# Patient Record
Sex: FEMALE | Race: WHITE | NOT HISPANIC OR LATINO | Employment: UNEMPLOYED | URBAN - NONMETROPOLITAN AREA
[De-identification: names, ages, dates, MRNs, and addresses within clinical notes are randomized per-mention and may not be internally consistent; named-entity substitution may affect disease eponyms.]

---

## 2018-01-11 NOTE — MISCELLANEOUS
Provider Comments  Provider Comments:   Dear Wesley Soares had a scheduled appointment at our office today but were unable to keep  We attempted to call you back but were unable to reach you  It is very important that you follow up with us so that we can assess your physical and nutritional safety after your surgery  Please call our office at [phone] to reschedule your appointment       Sincerely,     [facility]        Signatures   Electronically signed by : Sherryle Canny, [facility]; Jun 7 2016  4:10PM EST                       (Author)

## 2022-05-03 ENCOUNTER — OFFICE VISIT (OUTPATIENT)
Dept: PHYSICAL THERAPY | Facility: CLINIC | Age: 57
End: 2022-05-03
Payer: COMMERCIAL

## 2022-05-03 DIAGNOSIS — R53.1 RIGHT SIDED WEAKNESS: Primary | ICD-10-CM

## 2022-05-03 DIAGNOSIS — R26.9 ABNORMAL GAIT: ICD-10-CM

## 2022-05-03 DIAGNOSIS — Z86.73 HISTORY OF STROKE: ICD-10-CM

## 2022-05-03 PROCEDURE — 97162 PT EVAL MOD COMPLEX 30 MIN: CPT | Performed by: PHYSICAL THERAPIST

## 2022-05-03 NOTE — LETTER
May 3, 2022    Yesenia Webster MD  [address]  [address]  [facility] [address]    Patient: Alda Taylor   YOB: 1965   Date of Visit: 5/3/2022     Encounter Diagnosis     ICD-10-CM    1  Right sided weakness  R53 1    2  History of stroke  Z86 73    3  Abnormal gait  R26 9        Dear Dr Yahaira Ramon: Thank you for your recent referral of Alda Taylor  Please review the attached evaluation summary from Christian's recent visit  Please verify that you agree with the plan of care by signing the attached order  If you have any questions or concerns, please do not hesitate to call  I sincerely appreciate the opportunity to share in the care of one of your patients and hope to have another opportunity to work with you in the near future  Sincerely,    Ramona Doran, PT      Referring Provider:      I certify that I have read the below Plan of Care and certify the need for these services furnished under this plan of treatment while under my care  Yesenia Webster MD  [address]  [address] [place] [address]  Via Fax: [phone]          PT Evaluation     Today's date: 5/3/2022  Patient name: Alda Taylor  : 1965  MRN: [id number]  Referring provider: Christiano Ch PT  Dx:   Encounter Diagnosis     ICD-10-CM    1  Right sided weakness  R53 1    2  History of stroke  Z86 73    3  Abnormal gait  R26 9                   Assessment  Assessment details: Patient is a 62year old female who presents to PT with c/o right sided weakness causing decreased use of right UE and LE  PT examination shows limitations including right UE and LE weakness, decreased motor control, abnormal gait/balance and limited endurance limiting functional mobility   Patient would benefit from PT intervention including exercises for rom, strength and stability, functional training, manual therapy, HEP, postural training, aerobic conditioning, balance/gait training and pain relieving modalities in order to maximize functional independence  Impairments: abnormal gait, abnormal muscle tone, abnormal or restricted ROM, activity intolerance, impaired balance, impaired physical strength, lacks appropriate home exercise program, safety issue, weight-bearing intolerance and poor posture     Goals  ST  Initiate HEP  2  Patient to increase right UE and LE strength to 4-/5 t/o in 4 weeks    LT  Patient to increase right UE and LE strength to 4+/5 in 8 weeks  2  Patient to improve ambulation and balance to [facility] in 8 weeks  3  Patient to increase endurance to [facility] in 8 weeks    Plan  Patient would benefit from: PT eval and skilled physical therapy  Planned modality interventions: cryotherapy and thermotherapy: hydrocollator packs  Other planned modality interventions: Modalities PRN  Planned therapy interventions: IADL retraining, ADL retraining, manual therapy, balance, balance/weight bearing training, neuromuscular re-education, patient education, postural training, strengthening, stretching, therapeutic activities, therapeutic exercise, therapeutic training, transfer training, gait training, functional ROM exercises, flexibility, graded activity, graded exercise and home exercise program  Frequency: 2x week  Duration in visits: 8  Duration in weeks: 4  Treatment plan discussed with: patient        Subjective Evaluation    History of Present Illness  Date of onset: 2021  Mechanism of injury: Patient presents to PT with c/o right sided weakness stemming from CVA suffered on  of last year  Patient was d/c home from the hospital after 2 days and had rehab in SNF for a month afterwards  Patient's main complaint is with right UE  She has limited motion, strength and motor use of right UE and hand  She has difficulty lifting arm up to turn off her lights and is not able to write or grasp with right hand  Patient also notes weakness and foot drop in right ankle/foot   She reports she will get some locking in right shoulder which will cause pain  Patient now presents to PT via direct access  Pain  Aggravating factors: overhead activity, lifting and walking  Progression: no change    Social Support  Stairs in house: yes   15    Patient Goals  Patient goal: To work on my arm and get some strength back in my legs        Objective     Static Posture     Shoulders  Depressed  Neurological Testing     Sensation     Shoulder   Left Shoulder   Intact: light touch    Right Shoulder   Intact: light touch    Wrist/Hand   Left   Intact: light touch    Right   Diminished: light touch  Paresthesia: light touch    Active Range of Motion   Left Shoulder   Normal active range of motion    Right Shoulder   Flexion: 45 degrees   Abduction: 75 degrees     Left Elbow   Normal active range of motion    Right Elbow   Flexion: WFL  Extension: WFL    Additional Active Range of Motion Details  Right wrist PROM limited 50%  Patient with limited finger extension, she states her hand and fingers will continuously flex up on her    Passive Range of Motion   Left Shoulder   Normal passive range of motion    Right Shoulder   Flexion: 90 degrees   Abduction: 100 degrees     Right Wrist   Normal passive range of motion    Additional Passive Range of Motion Details  Full PROM in right hand    Strength/Myotome Testing     Right Shoulder     Planes of Motion   Flexion: 2+   Abduction: 3-   External rotation at 0°: 2+   Internal rotation at 0°: 3-     Right Elbow   Flexion: 3  Extension: 3  Forearm supination: 3  Forearm pronation: 3    Left Wrist/Hand   Normal wrist strength    Right Wrist/Hand   Wrist extension: 2+  Wrist flexion: 2+  Radial deviation: 2+  Ulnar deviation: 2+    Ambulation     Ambulation: Level Surfaces   Ambulation without assistive device: independent    Observational Gait   Gait: asymmetric   Decreased walking speed, stride length, right stance time and right step length     Right foot contact pattern: foot flat    Additional Observational Gait Details  Patient using AFO brace for foot drop on right                Precautions Previous CVA with right sided weakness       Manuals 5/3/22       Right Shoulder and wrist PROM and stretch                                Neuro Re-Ed         MTP/LTP        Side-Stepping        Stance on foam        Stance with EC                                                                Ther Ex        Nustep        UBE        TR/HR        Standing SLR 3-way        LAQ        Seated Marches        Seated HS curls with TB        Sup/Pron Roller        Bicep Curls        Wrist AROM                        Ther Activity                        Gait Training                        Modalities

## 2022-05-03 NOTE — PROGRESS NOTES
PT Evaluation     Today's date: 5/3/2022  Patient name: Mary Alejandro  : 1965  MRN: [id number]  Referring provider: Lonny Magallon PT  Dx:   Encounter Diagnosis     ICD-10-CM    1  Right sided weakness  R53 1    2  History of stroke  Z86 73    3  Abnormal gait  R26 9                   Assessment  Assessment details: Patient is a 62year old female who presents to PT with c/o right sided weakness causing decreased use of right UE and LE  PT examination shows limitations including right UE and LE weakness, decreased motor control, abnormal gait/balance and limited endurance limiting functional mobility  Patient would benefit from PT intervention including exercises for rom, strength and stability, functional training, manual therapy, HEP, postural training, aerobic conditioning, balance/gait training and pain relieving modalities in order to maximize functional independence  Impairments: abnormal gait, abnormal muscle tone, abnormal or restricted ROM, activity intolerance, impaired balance, impaired physical strength, lacks appropriate home exercise program, safety issue, weight-bearing intolerance and poor posture     Goals  ST  Initiate HEP  2  Patient to increase right UE and LE strength to 4-/5 t/o in 4 weeks    LT  Patient to increase right UE and LE strength to 4+/5 in 8 weeks  2  Patient to improve ambulation and balance to [facility] in 8 weeks  3   Patient to increase endurance to [facility] in 8 weeks    Plan  Patient would benefit from: PT eval and skilled physical therapy  Planned modality interventions: cryotherapy and thermotherapy: hydrocollator packs  Other planned modality interventions: Modalities PRN  Planned therapy interventions: IADL retraining, ADL retraining, manual therapy, balance, balance/weight bearing training, neuromuscular re-education, patient education, postural training, strengthening, stretching, therapeutic activities, therapeutic exercise, therapeutic training, transfer training, gait training, functional ROM exercises, flexibility, graded activity, graded exercise and home exercise program  Frequency: 2x week  Duration in visits: 8  Duration in weeks: 4  Treatment plan discussed with: patient        Subjective Evaluation    History of Present Illness  Date of onset: 6/27/2021  Mechanism of injury: Patient presents to PT with c/o right sided weakness stemming from CVA suffered on June 27th of last year  Patient was d/c home from the hospital after 2 days and had rehab in SNF for a month afterwards  Patient's main complaint is with right UE  She has limited motion, strength and motor use of right UE and hand  She has difficulty lifting arm up to turn off her lights and is not able to write or grasp with right hand  Patient also notes weakness and foot drop in right ankle/foot  She reports she will get some locking in right shoulder which will cause pain  Patient now presents to PT via direct access  Pain  Aggravating factors: overhead activity, lifting and walking  Progression: no change    Social Support  Stairs in house: yes   15    Patient Goals  Patient goal: To work on my arm and get some strength back in my legs        Objective     Static Posture     Shoulders  Depressed  Neurological Testing     Sensation     Shoulder   Left Shoulder   Intact: light touch    Right Shoulder   Intact: light touch    Wrist/Hand   Left   Intact: light touch    Right   Diminished: light touch  Paresthesia: light touch    Active Range of Motion   Left Shoulder   Normal active range of motion    Right Shoulder   Flexion: 45 degrees   Abduction: 75 degrees     Left Elbow   Normal active range of motion    Right Elbow   Flexion: WFL  Extension: WFL    Additional Active Range of Motion Details  Right wrist PROM limited 50%   Patient with limited finger extension, she states her hand and fingers will continuously flex up on her    Passive Range of Motion   Left Shoulder   Normal passive range of motion    Right Shoulder   Flexion: 90 degrees   Abduction: 100 degrees     Right Wrist   Normal passive range of motion    Additional Passive Range of Motion Details  Full PROM in right hand    Strength/Myotome Testing     Right Shoulder     Planes of Motion   Flexion: 2+   Abduction: 3-   External rotation at 0°: 2+   Internal rotation at 0°: 3-     Right Elbow   Flexion: 3  Extension: 3  Forearm supination: 3  Forearm pronation: 3    Left Wrist/Hand   Normal wrist strength    Right Wrist/Hand   Wrist extension: 2+  Wrist flexion: 2+  Radial deviation: 2+  Ulnar deviation: 2+    Ambulation     Ambulation: Level Surfaces   Ambulation without assistive device: independent    Observational Gait   Gait: asymmetric   Decreased walking speed, stride length, right stance time and right step length     Right foot contact pattern: foot flat    Additional Observational Gait Details  Patient using AFO brace for foot drop on right                Precautions Previous CVA with right sided weakness       Manuals 5/3/22       Right Shoulder and wrist PROM and stretch                                Neuro Re-Ed         MTP/LTP        Side-Stepping        Stance on foam        Stance with EC                                                                Ther Ex        Nustep        UBE        TR/HR        Standing SLR 3-way        LAQ        Seated Marches        Seated HS curls with TB        Sup/Pron Roller        Bicep Curls        Wrist AROM                        Ther Activity                        Gait Training                        Modalities

## 2022-05-06 ENCOUNTER — OFFICE VISIT (OUTPATIENT)
Dept: PHYSICAL THERAPY | Facility: CLINIC | Age: 57
End: 2022-05-06
Payer: COMMERCIAL

## 2022-05-06 DIAGNOSIS — R53.1 RIGHT SIDED WEAKNESS: Primary | ICD-10-CM

## 2022-05-06 PROCEDURE — 97110 THERAPEUTIC EXERCISES: CPT

## 2022-05-06 NOTE — PROGRESS NOTES
Daily Note     Today's date: 2022  Patient name: Gabriel Brown  : 1965  MRN: [id number]  Referring provider: Marichuy Frey PT  Dx:   Encounter Diagnosis     ICD-10-CM    1  Right sided weakness  R53 1                 Subjective:  "Im ok"        Objective: See treatment diary below      Assessment: Tolerated treatment well  Patient exhibited good technique with therapeutic exercises      Plan: Continue per plan of care         Manuals 5/3/22 2022      Right Shoulder and wrist PROM and stretch                                Neuro Re-Ed         MTP/LTP        Side-Stepping  3x30"      Stance on foam  3x30"      Stance with EC  3x30"                                                              Ther Ex        Nustep  l2   10      UBE        TR/HR  20      Standing SLR 3-way  2x10      LAQ  2x10      Seated Marches  2x10      Seated HS curls with TB  2x10      Sup/Pron Roller        Bicep Curls        Wrist AROM        Seated hip abd                Ther Activity                        Gait Training                        Modalities

## 2022-05-10 ENCOUNTER — OFFICE VISIT (OUTPATIENT)
Dept: PHYSICAL THERAPY | Facility: CLINIC | Age: 57
End: 2022-05-10
Payer: COMMERCIAL

## 2022-05-10 DIAGNOSIS — R53.1 RIGHT SIDED WEAKNESS: Primary | ICD-10-CM

## 2022-05-10 DIAGNOSIS — Z86.73 HISTORY OF STROKE: ICD-10-CM

## 2022-05-10 DIAGNOSIS — R26.9 ABNORMAL GAIT: ICD-10-CM

## 2022-05-10 PROCEDURE — 97110 THERAPEUTIC EXERCISES: CPT

## 2022-05-10 PROCEDURE — 97530 THERAPEUTIC ACTIVITIES: CPT

## 2022-05-10 NOTE — PROGRESS NOTES
Daily Note     Today's date: 5/10/2022  Patient name: Joao Stone  : 1965  MRN: [id number]  Referring provider: Ole Escobar, PT  Dx:   Encounter Diagnosis     ICD-10-CM    1  Right sided weakness  R53 1    2  History of stroke  Z86 73    3  Abnormal gait  R26 9                   Subjective:Patient reports that she is feeling good today  Objective: See treatment diary below      Assessment: Patient did well with all TE as listed, fatigued post tx  Pt will continue to benefit from skilled PT to maximize functional mobility  Plan: Continue per plan of care        Manuals 5/3/22 2022 5/10/22     Right Shoulder and wrist PROM and stretch                                Neuro Re-Ed         MTP/LTP        Side-Stepping  3x30" 3x30"     Stance on foam  3x30" 3x30"     Stance with EC  3x30" 3x30"                                                             Ther Ex        Nustep  l2   10 L2 10'     UBE        TR/HR  20 20     Standing SLR 3-way  2x10 2x10 ea     LAQ  2x10 2# 3" 2x10     Seated Marches  2x10 2x10     Seated HS curls with TB  2x10 RTB 2x10     Sup/Pron Roller        Bicep Curls   2# 2x10     Wrist AROM        Seated hip abd        pulleys   4 min     Ther Activity                        Gait Training                        Modalities

## 2022-05-11 ENCOUNTER — OFFICE VISIT (OUTPATIENT)
Dept: PHYSICAL THERAPY | Facility: CLINIC | Age: 57
End: 2022-05-11
Payer: COMMERCIAL

## 2022-05-11 DIAGNOSIS — Z86.73 HISTORY OF STROKE: ICD-10-CM

## 2022-05-11 DIAGNOSIS — R26.9 ABNORMAL GAIT: ICD-10-CM

## 2022-05-11 DIAGNOSIS — R53.1 RIGHT SIDED WEAKNESS: Primary | ICD-10-CM

## 2022-05-11 PROCEDURE — 97112 NEUROMUSCULAR REEDUCATION: CPT

## 2022-05-11 PROCEDURE — 97110 THERAPEUTIC EXERCISES: CPT

## 2022-05-11 NOTE — PROGRESS NOTES
Daily Note     Today's date: 2022  Patient name: Ryan Dasilva  : 1965  MRN: [id number]  Referring provider: Anne Miranda, PT  Dx:   Encounter Diagnosis     ICD-10-CM    1  Right sided weakness  R53 1    2  History of stroke  Z86 73    3  Abnormal gait  R26 9                   Subjective: Pt reports feeling good today  Did have some soreness following her LV; only lasted a day or two  Objective: See treatment diary below      Assessment: Tolerated treatment well  Continued with program as outlined below  Progressed with added resistance for all standing interventions  Was challenged with this progression but able to complete all assigned reps/sets  Patient would benefit from continued PT to further improve strength      Plan: Continue per plan of care        Manuals 5/3/22 2022 5/10/22 5/11/22    Right Shoulder and wrist PROM and stretch                                Neuro Re-Ed         MTP/LTP        Side-Stepping  3x30" 3x30" Airex, 2 laps    Tandem walk on airex    1 lap    Stance on foam  3x30" 3x30" 3x30"    Stance with EC  3x30" 3x30" 3x30"    On foam 2x30"                                                            Ther Ex        Nustep  l2   10 L2 10' L2 10'    UBE        TR/HR  20 20 20    Standing SLR 3-way  2x10 2x10 ea R 2#/ L 3# 2x10    LAQ  2x10 2# 3" 2x10 R 2#/ L 3# 2x10    Seated Marches  2x10 2x10 R 2#/ L 3#  2x10    Seated HS curls with TB  2x10 RTB 2x10 RTB 2x10    Sup/Pron Roller        Bicep Curls   2# 2x10 2# 2x10    Wrist AROM        Seated hip abd        pulleys   4 min 5 min    Ther Activity                        Gait Training                        Modalities

## 2022-05-17 ENCOUNTER — OFFICE VISIT (OUTPATIENT)
Dept: PHYSICAL THERAPY | Facility: CLINIC | Age: 57
End: 2022-05-17
Payer: COMMERCIAL

## 2022-05-17 DIAGNOSIS — R26.9 ABNORMAL GAIT: ICD-10-CM

## 2022-05-17 DIAGNOSIS — Z86.73 HISTORY OF STROKE: ICD-10-CM

## 2022-05-17 DIAGNOSIS — R53.1 RIGHT SIDED WEAKNESS: Primary | ICD-10-CM

## 2022-05-17 PROCEDURE — 97112 NEUROMUSCULAR REEDUCATION: CPT

## 2022-05-17 PROCEDURE — 97110 THERAPEUTIC EXERCISES: CPT

## 2022-05-17 NOTE — PROGRESS NOTES
Daily Note     Today's date: 2022  Patient name: Seth Sarmiento  : 1965  MRN: [id number]  Referring provider: Karthik Krishna PT  Dx:   Encounter Diagnosis     ICD-10-CM    1  Right sided weakness  R53 1    2  History of stroke  Z86 73    3  Abnormal gait  R26 9        Start Time: 0805  Stop Time: 0900  Total time in clinic (min): 55 minutes    Subjective: Patient offered no complaints at this time  Objective: See treatment diary below      Assessment: Tolerated treatment well  Patient exhibited good technique with therapeutic exercises and would benefit from continued PT to improve strength and ROM  She completed program with minimal to no fatigue, and did well with added hip abduction  Plan: Continue per plan of care        Manuals 5/3/22 2022 5/10/22 5/11/22 5/17   Right Shoulder and wrist PROM and stretch                                Neuro Re-Ed         MTP/LTP        Side-Stepping  3x30" 3x30" Airex, 2 laps 3x30"   Tandem walk on airex    1 lap    Stance on foam  3x30" 3x30" 3x30" 3x30'   Stance with EC  3x30" 3x30" 3x30"    On foam 2x30" 3x30"                                                                Ther Ex        Nustep  l2   10 L2 10' L2 10' 10 min L2    UBE        TR/HR  20 20 20 20x    Standing SLR 3-way  2x10 2x10 ea R 2#/ L 3# 2x10 R 2# / L3# 2x10    LAQ  2x10 2# 3" 2x10 R 2#/ L 3# 2x10 R2#/L3# 2x10   Seated Marches  2x10 2x10 R 2#/ L 3#  2x10 R 2#/ L 3#    Seated HS curls with TB  2x10 RTB 2x10 RTB 2x10 GTB 2x10    Sup/Pron Roller        Bicep Curls   2# 2x10 2# 2x10 2# cuff wgt 2x10    Wrist AROM        Seated hip abd     GTB 2x10    pulleys   4 min 5 min 5 min    Ther Activity                        Gait Training                        Modalities

## 2022-05-20 ENCOUNTER — OFFICE VISIT (OUTPATIENT)
Dept: PHYSICAL THERAPY | Facility: CLINIC | Age: 57
End: 2022-05-20
Payer: COMMERCIAL

## 2022-05-20 DIAGNOSIS — R53.1 RIGHT SIDED WEAKNESS: Primary | ICD-10-CM

## 2022-05-20 PROCEDURE — 97110 THERAPEUTIC EXERCISES: CPT

## 2022-05-20 PROCEDURE — 97112 NEUROMUSCULAR REEDUCATION: CPT

## 2022-05-20 NOTE — PROGRESS NOTES
Daily Note     Today's date: 2022  Patient name: Ashley Day  : 1965  MRN: [id number]  Referring provider: Gabriel Pinto PT  Dx:   Encounter Diagnosis     ICD-10-CM    1  Right sided weakness  R53 1                   Subjective:   "im ok"        Objective: See treatment diary below      Assessment: Tolerated treatment well  Patient exhibited good technique with therapeutic exercises      Plan: Continue per plan of care        Manuals 2022 2022 5/10/22 5/11/22 5/17   Right Shoulder and wrist PROM and stretch                                Neuro Re-Ed         MTP/LTP        Side-Stepping 3x30 3x30" 3x30" Airex, 2 laps 3x30"   Tandem walk on airex    1 lap    Stance on foam 3x30 3x30" 3x30" 3x30" 3x30'   Stance with EC 3x30 3x30" 3x30" 3x30"    On foam 2x30" 3x30"                                                                Ther Ex        Nustep L2  10  l2   10 L2 10' L2 10' 10 min L2    UBE        TR/HR 20 20 20 20 20x    Standing SLR 3-way 2x10 2x10 2x10 ea R 2#/ L 3# 2x10 R 2# / L3# 2x10    LAQ 2x10 2x10 2# 3" 2x10 R 2#/ L 3# 2x10 R2#/L3# 2x10   Seated Marches 2x10 2x10 2x10 R 2#/ L 3#  2x10 R 2#/ L 3#    Seated HS curls with TB 2x10 2x10 RTB 2x10 RTB 2x10 GTB 2x10    Sup/Pron Roller        Bicep Curls 2#  2x10  2# 2x10 2# 2x10 2# cuff wgt 2x10    Wrist AROM        Seated hip abd     GTB 2x10    pulleys 5  4 min 5 min 5 min    Ther Activity                        Gait Training                        Modalities

## 2022-05-24 ENCOUNTER — APPOINTMENT (OUTPATIENT)
Dept: PHYSICAL THERAPY | Facility: CLINIC | Age: 57
End: 2022-05-24
Payer: COMMERCIAL

## 2022-05-26 ENCOUNTER — OFFICE VISIT (OUTPATIENT)
Dept: PHYSICAL THERAPY | Facility: CLINIC | Age: 57
End: 2022-05-26
Payer: COMMERCIAL

## 2022-05-26 DIAGNOSIS — R26.9 ABNORMAL GAIT: ICD-10-CM

## 2022-05-26 DIAGNOSIS — Z86.73 HISTORY OF STROKE: ICD-10-CM

## 2022-05-26 DIAGNOSIS — R53.1 RIGHT SIDED WEAKNESS: Primary | ICD-10-CM

## 2022-05-26 PROCEDURE — 97112 NEUROMUSCULAR REEDUCATION: CPT

## 2022-05-26 PROCEDURE — 97110 THERAPEUTIC EXERCISES: CPT

## 2022-05-26 NOTE — PROGRESS NOTES
Daily Note     Today's date: 2022  Patient name: Ulysses Abelson  : 1965  MRN: [id number]  Referring provider: Dom Rg PT  Dx:   Encounter Diagnosis     ICD-10-CM    1  Right sided weakness  R53 1    2  History of stroke  Z86 73    3  Abnormal gait  R26 9        Start Time: 1100  Stop Time: 1145  Total time in clinic (min): 45 minutes    Subjective: Patient stated doing well today, as she had her OT earlier today and had gone for a walk       Objective: See treatment diary below      Assessment: Patient did well with program today, some fatigue noted post treat  She continues to do well with exercises, including added balance and exercises  Plan: Continue per plan of care        Manuals 2022 5/26 5/10/22 5/11/22 5/17   Right Shoulder and wrist PROM and stretch                                Neuro Re-Ed         MTP/LTP        Side-Stepping 3x30 3x30  3x30" Airex, 2 laps 3x30"   Tandem walk on airex  On floor 3x8'   1 lap    Stance on foam 3x30 3x30 3x30" 3x30" 3x30'   Stance with EC 3x30 3x30  3x30" 3x30"    On foam 2x30" 3x30"        grapevine  3x8'                                                       Ther Ex        Nustep L2  10  L2   10 L2 10' L2 10' 10 min L2    lunges  3x8'       TR/HR 20 20x 20 20 20x    Standing SLR 3-way 2x10 R 2# / L3# 2x10  2x10 ea R 2#/ L 3# 2x10 R 2# / L3# 2x10    LAQ 2x10 R 2#/ L3# 2x10  2# 3" 2x10 R 2#/ L 3# 2x10 R2#/L3# 2x10   Seated Marches 2x10 R2# / L3#   2x10  2x10 R 2#/ L 3#  2x10 R 2#/ L 3#    Seated HS curls with TB 2x10 GTB 2x10 RTB 2x10 RTB 2x10 GTB 2x10    Sup/Pron Roller        Bicep Curls 2#  2x10   2# 2x10 2# 2x10 2# cuff wgt 2x10    Wrist AROM        Seated hip abd  GTB 2x10   GTB 2x10    squat  2x10       pulleys 5 5 min  4 min 5 min 5 min    Ther Activity                        Gait Training                        Modalities

## 2022-05-27 ENCOUNTER — OFFICE VISIT (OUTPATIENT)
Dept: PHYSICAL THERAPY | Facility: CLINIC | Age: 57
End: 2022-05-27
Payer: COMMERCIAL

## 2022-05-27 DIAGNOSIS — R53.1 RIGHT SIDED WEAKNESS: Primary | ICD-10-CM

## 2022-05-27 PROCEDURE — 97140 MANUAL THERAPY 1/> REGIONS: CPT

## 2022-05-27 PROCEDURE — 97110 THERAPEUTIC EXERCISES: CPT

## 2022-05-31 ENCOUNTER — OFFICE VISIT (OUTPATIENT)
Dept: PHYSICAL THERAPY | Facility: CLINIC | Age: 57
End: 2022-05-31
Payer: COMMERCIAL

## 2022-05-31 DIAGNOSIS — Z86.73 HISTORY OF STROKE: ICD-10-CM

## 2022-05-31 DIAGNOSIS — R53.1 RIGHT SIDED WEAKNESS: Primary | ICD-10-CM

## 2022-05-31 DIAGNOSIS — R26.9 ABNORMAL GAIT: ICD-10-CM

## 2022-05-31 PROCEDURE — 97110 THERAPEUTIC EXERCISES: CPT | Performed by: PHYSICAL THERAPIST

## 2022-05-31 PROCEDURE — 97112 NEUROMUSCULAR REEDUCATION: CPT | Performed by: PHYSICAL THERAPIST

## 2022-05-31 NOTE — PROGRESS NOTES
Daily Note     Today's date: 2022  Patient name: Jorge Yap  : 1965  MRN: [id number]  Referring provider: Aashish Carrillo PT  Dx:   Encounter Diagnosis     ICD-10-CM    1  Right sided weakness  R53 1    2  History of stroke  Z86 73    3  Abnormal gait  R26 9                   Subjective: "I'm ok today"  Objective: See treatment diary below      Assessment: Tolerated treatment well  Patient exhibited good technique with therapeutic exercises      Plan: Continue per plan of care        Manuals 2022   Right Shoulder and wrist PROM and stretch                                Neuro Re-Ed         MTP/LTP        Side-Stepping 3x30 3x30  3x30" Airex, 2 laps 3x30"   Tandem walk on airex  On floor 3x8'  3x8' 1 lap    Stance on foam 3x30 3x30 3x30" 3x30" 3x30'   Stance with EC 3x30 3x30  3x30" 3x30"    On foam 2x30" 3x30"        grapevine  3x8'  3x8'                                                     Ther Ex        Nustep L2  10  L2   10 L2 10' L2 10' 10 min L2    lunges  3x8'  3x8'     TR/HR 20 20x 20 20 20x    Standing SLR 3-way 2x10 R 2# / L3# 2x10  R 2#, L 3# 2x10 ea R 2#/ L 3# 2x10 R 2# / L3# 2x10    LAQ 2x10 R 2#/ L3# 2x10  R 2# L 3# 3" 2x10 R 2#/ L 3# 2x10 R2#/L3# 2x10   Seated Marches 2x10 R2# / L3#   2x10  R 2#, L 3# 2x10 R 2#/ L 3#  2x10 R 2#/ L 3#    Seated HS curls with TB 2x10 GTB 2x10 GTB 2x10 RTB 2x10 GTB 2x10    Sup/Pron Roller        Bicep Curls 2#  2x10  2#  2x10 2# 2x10 2# 2x10 2# cuff wgt 2x10    Wrist AROM        Seated hip abd  GTB 2x10 GTB 2x10  GTB 2x10    squat  2x10  2x10     pulleys 5 5 min  4 min 5 min 5 min    Ther Activity                        Gait Training                        Modalities

## 2022-06-02 ENCOUNTER — OFFICE VISIT (OUTPATIENT)
Dept: PHYSICAL THERAPY | Facility: CLINIC | Age: 57
End: 2022-06-02
Payer: COMMERCIAL

## 2022-06-02 DIAGNOSIS — Z86.73 HISTORY OF STROKE: ICD-10-CM

## 2022-06-02 DIAGNOSIS — R26.9 ABNORMAL GAIT: ICD-10-CM

## 2022-06-02 DIAGNOSIS — R53.1 RIGHT SIDED WEAKNESS: Primary | ICD-10-CM

## 2022-06-02 PROCEDURE — 97110 THERAPEUTIC EXERCISES: CPT

## 2022-06-02 PROCEDURE — 97140 MANUAL THERAPY 1/> REGIONS: CPT

## 2022-06-02 NOTE — PROGRESS NOTES
Daily Note     Today's date: 2022  Patient name: Danni Rivera  : 1965  MRN: [id number]  Referring provider: Deonte Jacobs, PT  Dx:   Encounter Diagnosis     ICD-10-CM    1  Right sided weakness  R53 1    2  History of stroke  Z86 73    3  Abnormal gait  R26 9        Start Time: 1100  Stop Time: 1150  Total time in clinic (min): 50 minutes    Subjective: Patient offered no complaints at this time  Objective: See treatment diary below      Assessment: Patient continues to do well with program, less fatigue noted post treat; short rests between exercises  Minimal to no cueing provided during exercises, and balance drills  Plan: Continue per plan of care        Manuals 2022   Right Shoulder and wrist PROM and stretch                                Neuro Re-Ed         MTP/LTP        Side-Stepping 3x30 3x30  3x30" Airex, 2 laps 3x30"   Tandem walk on airex  On floor 3x8'  3x8' 1 lap    Stance on foam 3x30 3x30 3x30" 3x30" 3x30'   Stance with EC 3x30 3x30  3x30" 3x30"    On foam 2x30" 3x30"        grapevine  3x8'  3x8'                                                     Ther Ex        Nustep L2  10  L2   10 L2 10' L2 10' 10 min L2    lunges  3x8'  3x8'     TR/HR 20 20x 20 20x  20x    Standing SLR 3-way 2x10 R 2# / L3# 2x10  R 2#, L 3# 2x10 ea R 2#/ L 3# 2x10 R 2# / L3# 2x10    LAQ 2x10 R 2#/ L3# 2x10  R 2# L 3# 3" 2x10 R 2#/ L 3# 2x10 R2#/L3# 2x10   Seated Marches 2x10 R2# / L3#   2x10  R 2#, L 3# 2x10 R 2#/ L 3#  2x10 R 2#/ L 3#    Seated HS curls with TB 2x10 GTB 2x10 GTB 2x10 RTB 2x10 GTB 2x10    Sup/Pron Roller        Bicep Curls 2#  2x10  2#  2x10 2# 2x10 2# 2x10 2# cuff wgt 2x10    Wrist AROM        Seated hip abd  GTB 2x10 GTB 2x10 GTB 2x10  GTB 2x10    squat  2x10  2x10 2x10    pulleys 5 5 min  4 min 5 min 5 min    Ther Activity                        Gait Training                        Modalities

## 2022-06-02 NOTE — LETTER
2022    Flores Hager MD  [address]  [address] [facility]    Patient: Delaney Oliveira   YOB: 1965   Date of Visit: 2022     Encounter Diagnosis     ICD-10-CM    1  Right sided weakness  R53 1    2  History of stroke  Z86 73    3  Abnormal gait  R26 9        Dear Dr Shana Marie: Thank you for your recent referral of Delaney Oliveira  Please review the attached evaluation summary from Christian's recent visit  Please verify that you agree with the plan of care by signing the attached order  If you have any questions or concerns, please do not hesitate to call  I sincerely appreciate the opportunity to share in the care of one of your patients and hope to have another opportunity to work with you in the near future  Sincerely,    Romeo Olson, PT      Referring Provider:      I certify that I have read the below Plan of Care and certify the need for these services furnished under this plan of treatment while under my care  Flores Hager MD  [address]  [address] [place] [address]  Via Fax: [phone]          Daily Note     Today's date: 2022  Patient name: Delaney Oliveira  : 1965  MRN: [id number]  Referring provider: Stella Da Silva PT  Dx:   Encounter Diagnosis     ICD-10-CM    1  Right sided weakness  R53 1    2  History of stroke  Z86 73    3  Abnormal gait  R26 9        Start Time: 1100  Stop Time: 1150  Total time in clinic (min): 50 minutes    Subjective: Patient offered no complaints at this time  Objective: See treatment diary below      Assessment: Patient continues to do well with program, less fatigue noted post treat; short rests between exercises  Minimal to no cueing provided during exercises, and balance drills  Plan: Continue per plan of care        Manuals 2022   Right Shoulder and wrist PROM and stretch                                Neuro Re-Ed MTP/LTP        Side-Stepping 3x30 3x30  3x30" Airex, 2 laps 3x30"   Tandem walk on airex  On floor 3x8'  3x8' 1 lap    Stance on foam 3x30 3x30 3x30" 3x30" 3x30'   Stance with EC 3x30 3x30  3x30" 3x30"    On foam 2x30" 3x30"        grapevine  3x8'  3x8'                                                     Ther Ex        Nustep L2  10  L2   10 L2 10' L2 10' 10 min L2    lunges  3x8'  3x8'     TR/HR 20 20x 20 20x  20x    Standing SLR 3-way 2x10 R 2# / L3# 2x10  R 2#, L 3# 2x10 ea R 2#/ L 3# 2x10 R 2# / L3# 2x10    LAQ 2x10 R 2#/ L3# 2x10  R 2# L 3# 3" 2x10 R 2#/ L 3# 2x10 R2#/L3# 2x10   Seated Marches 2x10 R2# / L3#   2x10  R 2#, L 3# 2x10 R 2#/ L 3#  2x10 R 2#/ L 3#    Seated HS curls with TB 2x10 GTB 2x10 GTB 2x10 RTB 2x10 GTB 2x10    Sup/Pron Roller        Bicep Curls 2#  2x10  2#  2x10 2# 2x10 2# 2x10 2# cuff wgt 2x10    Wrist AROM        Seated hip abd  GTB 2x10 GTB 2x10 GTB 2x10  GTB 2x10    squat  2x10  2x10 2x10    pulleys 5 5 min  4 min 5 min 5 min    Ther Activity                        Gait Training                        Modalities                                                    Attestation signed by Alice Cazares PT at 2022  1:50 PM:  I supervised the visit  We discussed the case to ensure appropriate continuation and progression of care and I reviewed the documentation  PT Re-Evaluation     Today's date: 2022  Patient name: Kacy Dove  : 1965  MRN: [id number]  Referring provider: Sung Chavira, PT  Dx:   Encounter Diagnosis     ICD-10-CM    1  Right sided weakness  R53 1    2  History of stroke  Z86 73    3  Abnormal gait  R26 9        Start Time: 1100  Stop Time: 1150  Total time in clinic (min): 50 minutes    Assessment  Assessment details: Patient has made good progress with PT POC through first 4 weeks of PT  Patient is showing improved balance and overall LE strength  Patient has bee d/c from home OT and will add more UE exercises to PT POC   Patient would benefit from continued PT intervention with focus on improving UE/LE strength, increasing rom, improving balance and improving conditioning in order to maximize functional independence and mobility  Impairments: abnormal gait, abnormal muscle tone, abnormal or restricted ROM, activity intolerance, impaired balance, impaired physical strength, lacks appropriate home exercise program, safety issue, weight-bearing intolerance and poor posture     Goals  ST  Initiate HEP- Met   2  Patient to increase right UE and LE strength to 4-/5 t/o in 4 weeks- Progressing    LT  Patient to increase right UE and LE strength to 4+/5 in 8 weeks- Progressing  2  Patient to improve ambulation and balance to [facility] in 8 weeks- Progressing  3  Patient to increase endurance to [facility] in 8 weeks- Progressing    Plan  Patient would benefit from: skilled physical therapy  Planned modality interventions: cryotherapy and thermotherapy: hydrocollator packs  Other planned modality interventions: Modalities PRN  Planned therapy interventions: IADL retraining, ADL retraining, manual therapy, balance, balance/weight bearing training, neuromuscular re-education, patient education, postural training, strengthening, stretching, therapeutic activities, therapeutic exercise, therapeutic training, transfer training, gait training, functional ROM exercises, flexibility, graded activity, graded exercise and home exercise program  Frequency: 2x week  Duration in visits: 8  Duration in weeks: 4  Treatment plan discussed with: patient        Subjective Evaluation    History of Present Illness  Date of onset: 2021  Pain  Aggravating factors: overhead activity, lifting and walking  Progression: improved    Social Support  Stairs in house: yes   15    Patient Goals  Patient goal: To work on my arm and get some strength back in my legs        Objective     Static Posture     Shoulders  Depressed      Neurological Testing     Sensation     Shoulder   Left Shoulder   Intact: light touch    Right Shoulder   Intact: light touch    Wrist/Hand   Left   Intact: light touch    Right   Diminished: light touch  Paresthesia: light touch    Active Range of Motion   Left Shoulder   Normal active range of motion    Right Shoulder   Flexion: 45 degrees   Abduction: 75 degrees     Left Elbow   Normal active range of motion    Right Elbow   Flexion: WFL  Extension: WFL    Additional Active Range of Motion Details  Right wrist PROM limited 50%  Patient with limited finger extension, she states her hand and fingers will continuously flex up on her    Passive Range of Motion   Left Shoulder   Normal passive range of motion    Right Shoulder   Flexion: 90 degrees   Abduction: 100 degrees     Right Wrist   Normal passive range of motion    Additional Passive Range of Motion Details  Full PROM in right hand    Strength/Myotome Testing     Right Shoulder     Planes of Motion   Flexion: 2+   Abduction: 3-   External rotation at 0°: 2+   Internal rotation at 0°: 3-     Right Elbow   Flexion: 3  Extension: 3  Forearm supination: 3  Forearm pronation: 3    Left Wrist/Hand   Normal wrist strength    Right Wrist/Hand   Wrist extension: 2+  Wrist flexion: 2+  Radial deviation: 2+  Ulnar deviation: 2+    Ambulation     Ambulation: Level Surfaces   Ambulation without assistive device: independent    Observational Gait   Gait: asymmetric   Decreased walking speed, stride length, right stance time and right step length     Right foot contact pattern: foot flat    Additional Observational Gait Details  Patient using AFO brace for foot drop on right                Precautions Previous CVA with right sided weakness       Manuals 5/3/22       Right Shoulder and wrist PROM and stretch                                Neuro Re-Ed         MTP/LTP        Side-Stepping        Stance on foam        Stance with EC                                                                Ther Ex        Nustep        UBE TR/HR        Standing SLR 3-way        LAQ        Seated Marches        Seated HS curls with TB        Sup/Pron Roller        Bicep Curls        Wrist AROM                        Ther Activity                        Gait Training                        Modalities

## 2022-06-03 ENCOUNTER — APPOINTMENT (OUTPATIENT)
Dept: PHYSICAL THERAPY | Facility: CLINIC | Age: 57
End: 2022-06-03
Payer: COMMERCIAL

## 2022-06-07 ENCOUNTER — OFFICE VISIT (OUTPATIENT)
Dept: PHYSICAL THERAPY | Facility: CLINIC | Age: 57
End: 2022-06-07
Payer: COMMERCIAL

## 2022-06-07 DIAGNOSIS — R53.1 RIGHT SIDED WEAKNESS: Primary | ICD-10-CM

## 2022-06-07 DIAGNOSIS — R26.9 ABNORMAL GAIT: ICD-10-CM

## 2022-06-07 DIAGNOSIS — Z86.73 HISTORY OF STROKE: ICD-10-CM

## 2022-06-07 PROCEDURE — 97112 NEUROMUSCULAR REEDUCATION: CPT | Performed by: PHYSICAL THERAPIST

## 2022-06-07 PROCEDURE — 97110 THERAPEUTIC EXERCISES: CPT | Performed by: PHYSICAL THERAPIST

## 2022-06-07 NOTE — PROGRESS NOTES
Daily Note     Today's date: 2022  Patient name: Bob Phillips  : 1965  MRN: [id number]  Referring provider: Tamra Israel PT  Dx:   Encounter Diagnosis     ICD-10-CM    1  Right sided weakness  R53 1    2  History of stroke  Z86 73    3  Abnormal gait  R26 9                   Subjective: Patient states "I'm doing ok today"  Objective: See treatment diary below      Assessment: Tolerated treatment well  Patient exhibited good technique with therapeutic exercises      Plan: Continue per plan of care        Manuals 2022   Right Shoulder and wrist PROM and stretch                                Neuro Re-Ed         MTP/LTP        Side-Stepping 3x30 3x30  3x30" Airex, 2 laps 3x30"   Tandem walk on airex  On floor 3x8'  3x8' 1 lap 4x8'   Stance on foam 3x30 3x30 3x30" 3x30" 3x30'   Stance with EC 3x30 3x30  3x30" 3x30"    On foam 2x30" 3x30"        grapevine  3x8'  3x8'  3x8'                                                   Ther Ex        Nustep L2  10  L2   10 L2 10' L2 10' 10 min L2    lunges  3x8'  3x8'  3x8'   TR/HR 20 20x 20 20x  20x    Standing SLR 3-way 2x10 R 2# / L3# 2x10  R 2#, L 3# 2x10 ea R 2#/ L 3# 2x10 R 2# / L3# 2x10    LAQ 2x10 R 2#/ L3# 2x10  R 2# L 3# 3" 2x10 R 2#/ L 3# 2x10 R2#/L3# 2x10   Seated Marches 2x10 R2# / L3#   2x10  R 2#, L 3# 2x10 R 2#/ L 3#  2x10 R 2#/ L 3#    Seated HS curls with TB 2x10 GTB 2x10 GTB 2x10 RTB 2x10 GTB 2x10    Sup/Pron Roller        Bicep Curls 2#  2x10  2#  2x10 2# 2x10 2# 2x10 2# cuff wgt 2x10    Wrist AROM        Seated hip abd  GTB 2x10 GTB 2x10 GTB 2x10  GTB 2x10    squat  2x10  2x10 2x10 2x10   pulleys 5 5 min  4 min 5 min 5 min    Ther Activity                        Gait Training                        Modalities

## 2022-06-09 NOTE — PROGRESS NOTES
PT Re-Evaluation     Today's date: 2022  Patient name: Danni Rivera  : 1965  MRN: [id number]  Referring provider: Deonte Jacobs, PT  Dx:   Encounter Diagnosis     ICD-10-CM    1  Right sided weakness  R53 1    2  History of stroke  Z86 73    3  Abnormal gait  R26 9        Start Time: 1100  Stop Time: 1150  Total time in clinic (min): 50 minutes    Assessment  Assessment details: Patient has made good progress with PT POC through first 4 weeks of PT  Patient is showing improved balance and overall LE strength  Patient has bee d/c from home OT and will add more UE exercises to PT POC  Patient would benefit from continued PT intervention with focus on improving UE/LE strength, increasing rom, improving balance and improving conditioning in order to maximize functional independence and mobility  Impairments: abnormal gait, abnormal muscle tone, abnormal or restricted ROM, activity intolerance, impaired balance, impaired physical strength, lacks appropriate home exercise program, safety issue, weight-bearing intolerance and poor posture     Goals  ST  Initiate HEP- Met   2  Patient to increase right UE and LE strength to 4-/5 t/o in 4 weeks- Progressing    LT  Patient to increase right UE and LE strength to 4+/5 in 8 weeks- Progressing  2  Patient to improve ambulation and balance to [facility] in 8 weeks- Progressing  3   Patient to increase endurance to [facility] in 8 weeks- Progressing    Plan  Patient would benefit from: skilled physical therapy  Planned modality interventions: cryotherapy and thermotherapy: hydrocollator packs  Other planned modality interventions: Modalities PRN  Planned therapy interventions: IADL retraining, ADL retraining, manual therapy, balance, balance/weight bearing training, neuromuscular re-education, patient education, postural training, strengthening, stretching, therapeutic activities, therapeutic exercise, therapeutic training, transfer training, gait training, functional ROM exercises, flexibility, graded activity, graded exercise and home exercise program  Frequency: 2x week  Duration in visits: 8  Duration in weeks: 4  Treatment plan discussed with: patient        Subjective Evaluation    History of Present Illness  Date of onset: 6/27/2021  Pain  Aggravating factors: overhead activity, lifting and walking  Progression: improved    Social Support  Stairs in house: yes   15    Patient Goals  Patient goal: To work on my arm and get some strength back in my legs        Objective     Static Posture     Shoulders  Depressed  Neurological Testing     Sensation     Shoulder   Left Shoulder   Intact: light touch    Right Shoulder   Intact: light touch    Wrist/Hand   Left   Intact: light touch    Right   Diminished: light touch  Paresthesia: light touch    Active Range of Motion   Left Shoulder   Normal active range of motion    Right Shoulder   Flexion: 45 degrees   Abduction: 75 degrees     Left Elbow   Normal active range of motion    Right Elbow   Flexion: WFL  Extension: WFL    Additional Active Range of Motion Details  Right wrist PROM limited 50%   Patient with limited finger extension, she states her hand and fingers will continuously flex up on her    Passive Range of Motion   Left Shoulder   Normal passive range of motion    Right Shoulder   Flexion: 90 degrees   Abduction: 100 degrees     Right Wrist   Normal passive range of motion    Additional Passive Range of Motion Details  Full PROM in right hand    Strength/Myotome Testing     Right Shoulder     Planes of Motion   Flexion: 2+   Abduction: 3-   External rotation at 0°: 2+   Internal rotation at 0°: 3-     Right Elbow   Flexion: 3  Extension: 3  Forearm supination: 3  Forearm pronation: 3    Left Wrist/Hand   Normal wrist strength    Right Wrist/Hand   Wrist extension: 2+  Wrist flexion: 2+  Radial deviation: 2+  Ulnar deviation: 2+    Ambulation     Ambulation: Level Surfaces   Ambulation without assistive device: independent    Observational Gait   Gait: asymmetric   Decreased walking speed, stride length, right stance time and right step length     Right foot contact pattern: foot flat    Additional Observational Gait Details  Patient using AFO brace for foot drop on right                Precautions Previous CVA with right sided weakness       Manuals 5/3/22       Right Shoulder and wrist PROM and stretch                                Neuro Re-Ed         MTP/LTP        Side-Stepping        Stance on foam        Stance with EC                                                                Ther Ex        Nustep        UBE        TR/HR        Standing SLR 3-way        LAQ        Seated Marches        Seated HS curls with TB        Sup/Pron Roller        Bicep Curls        Wrist AROM                        Ther Activity                        Gait Training                        Modalities

## 2022-06-10 ENCOUNTER — OFFICE VISIT (OUTPATIENT)
Dept: PHYSICAL THERAPY | Facility: CLINIC | Age: 57
End: 2022-06-10
Payer: COMMERCIAL

## 2022-06-10 DIAGNOSIS — R53.1 RIGHT SIDED WEAKNESS: Primary | ICD-10-CM

## 2022-06-10 PROCEDURE — 97110 THERAPEUTIC EXERCISES: CPT

## 2022-06-10 PROCEDURE — 97112 NEUROMUSCULAR REEDUCATION: CPT

## 2022-06-10 NOTE — PROGRESS NOTES
Daily Note     Today's date: 6/10/2022  Patient name: Moisés Thomas  : 1965  MRN: [id number]  Referring provider: Mirta Cortez, PT  Dx:   Encounter Diagnosis     ICD-10-CM    1  Right sided weakness  R53 1        Start Time: 0800          Subjective:   : "Im doing good"        Objective: See treatment diary below      Assessment: Tolerated treatment well  Patient exhibited good technique with therapeutic exercises      Plan: Continue per plan of care        Manuals 6/10/2022 5/27/22 5/31/22 6/2 6/7   Right Shoulder and wrist PROM and stretch                                Neuro Re-Ed         MTP/LTP        Side-Stepping 3x30 3x30  3x30" Airex, 2 laps 3x30"   Tandem walk on airex 4x8 On floor 3x8'  3x8' 1 lap 4x8'   Stance on foam 3x30 3x30 3x30" 3x30" 3x30'   Stance with EC 3x30 3x30  3x30" 3x30"    On foam 2x30" 3x30"        grapevine 3x8 3x8'  3x8'  3x8'                                                   Ther Ex        Nustep L2  10  L2   10 L2 10' L2 10' 10 min L2    lunges  3x8'  3x8'  3x8'   TR/HR 20 20x 20 20x  20x    Standing SLR 3-way 2x10 R 2# / L3# 2x10  R 2#, L 3# 2x10 ea R 2#/ L 3# 2x10 R 2# / L3# 2x10    LAQ 2x10 R 2#/ L3# 2x10  R 2# L 3# 3" 2x10 R 2#/ L 3# 2x10 R2#/L3# 2x10   Seated Marches 2x10 R2# / L3#   2x10  R 2#, L 3# 2x10 R 2#/ L 3#  2x10 R 2#/ L 3#    Seated HS curls with TB 2x10 GTB 2x10 GTB 2x10 RTB 2x10 GTB 2x10    Sup/Pron Roller        Bicep Curls 2#  2x10  2#  2x10 2# 2x10 2# 2x10 2# cuff wgt 2x10    Wrist AROM        Seated hip abd  GTB 2x10 GTB 2x10 GTB 2x10  GTB 2x10    squat  2x10  2x10 2x10 2x10   pulleys 5 5 min  4 min 5 min 5 min    Ther Activity                        Gait Training                        Modalities

## 2022-06-14 ENCOUNTER — OFFICE VISIT (OUTPATIENT)
Dept: PHYSICAL THERAPY | Facility: CLINIC | Age: 57
End: 2022-06-14
Payer: COMMERCIAL

## 2022-06-14 DIAGNOSIS — R53.1 RIGHT SIDED WEAKNESS: Primary | ICD-10-CM

## 2022-06-14 DIAGNOSIS — R26.9 ABNORMAL GAIT: ICD-10-CM

## 2022-06-14 DIAGNOSIS — Z86.73 HISTORY OF STROKE: ICD-10-CM

## 2022-06-14 PROCEDURE — 97110 THERAPEUTIC EXERCISES: CPT | Performed by: PHYSICAL THERAPIST

## 2022-06-14 PROCEDURE — 97112 NEUROMUSCULAR REEDUCATION: CPT | Performed by: PHYSICAL THERAPIST

## 2022-06-14 NOTE — PROGRESS NOTES
Daily Note     Today's date: 2022  Patient name: Yue Renae  : 1965  MRN: [id number]  Referring provider: Live Rincon PT  Dx:   Encounter Diagnosis     ICD-10-CM    1  Right sided weakness  R53 1    2  History of stroke  Z86 73    3  Abnormal gait  R26 9                   Subjective: Patient without new c/o today  Objective: See treatment diary below      Assessment: Tolerated treatment well  Patient exhibited good technique with therapeutic exercises  Added right sided shrugs with TB  Plan: Continue per plan of care        Manuals 6/10/2022 6/14/22 5/31/22 6/2 6/7   Right Shoulder and wrist PROM and stretch                                Neuro Re-Ed         MTP/LTP        Side-Stepping 3x30 3x30  3x30" Airex, 2 laps 3x30"   Tandem walk on airex 4x8 On floor 4x8'  3x8' 1 lap 4x8'   Stance on foam 3x30 3x30 3x30" 3x30" 3x30'   Stance with EC 3x30 3x30  3x30" 3x30"    On foam 2x30" 3x30"        grapevine 3x8 3x8'  3x8'  3x8'                                                   Ther Ex        Nustep L2  10  L2   10 L2 10' L2 10' 10 min L2    lunges   3x8'  3x8'   TR/HR 20 20x 20 20x  20x    Standing SLR 3-way 2x10 R 2# / L3# 2x10  R 2#, L 3# 2x10 ea R 2#/ L 3# 2x10 R 2# / L3# 2x10    LAQ 2x10 R 2#/ L3# 2x10  R 2# L 3# 3" 2x10 R 2#/ L 3# 2x10 R2#/L3# 2x10   Seated Marches 2x10 R2# / L3#   2x10  R 2#, L 3# 2x10 R 2#/ L 3#  2x10 R 2#/ L 3#    Seated HS curls with TB 2x10 GTB 2x10 GTB 2x10 RTB 2x10 GTB 2x10    Sup/Pron Roller        Bicep Curls 2#  2x10  2#  2x10 2# 2x10 2# 2x10 2# cuff wgt 2x10    Right Side Shrugs with TB  RTB 2x10      Seated hip abd  GTB 2x10 GTB 2x10 GTB 2x10  GTB 2x10    squat  2x10  2x10 2x10 2x10   pulleys 5 5 min  4 min 5 min 5 min    Ther Activity                        Gait Training                        Modalities

## 2022-06-17 ENCOUNTER — OFFICE VISIT (OUTPATIENT)
Dept: PHYSICAL THERAPY | Facility: CLINIC | Age: 57
End: 2022-06-17
Payer: COMMERCIAL

## 2022-06-17 DIAGNOSIS — Z86.73 HISTORY OF STROKE: ICD-10-CM

## 2022-06-17 DIAGNOSIS — R53.1 RIGHT SIDED WEAKNESS: Primary | ICD-10-CM

## 2022-06-17 DIAGNOSIS — R26.9 ABNORMAL GAIT: ICD-10-CM

## 2022-06-17 PROCEDURE — 97112 NEUROMUSCULAR REEDUCATION: CPT | Performed by: PHYSICAL THERAPIST

## 2022-06-17 PROCEDURE — 97110 THERAPEUTIC EXERCISES: CPT | Performed by: PHYSICAL THERAPIST

## 2022-06-17 NOTE — PROGRESS NOTES
Daily Note     Today's date: 2022  Patient name: Micheal Reilly  : 1965  MRN: [id number]  Referring provider: Natasha Ch, PT  Dx:   Encounter Diagnosis     ICD-10-CM    1  Right sided weakness  R53 1    2  History of stroke  Z86 73    3  Abnormal gait  R26 9                   Subjective: The patient states that she is doing wonderful today  No complaints at start of session  Objective: See treatment diary below      Assessment: Tolerated treatment well  No LOB noted with TE  Patient demonstrated fatigue post treatment and would benefit from continued PT to improve function and mobility  Plan: Continue per plan of care  Progress as able in upcoming visits         Manuals 6/10/2022 6/14/22 6/17/22 6/2 6/7   Right Shoulder and wrist PROM and stretch                                Neuro Re-Ed         MTP/LTP        Side-Stepping 3x30 3x30  3x30' Airex, 2 laps 3x30"   Tandem walk on airex 4x8 On floor 4x8'  On floor 4x8' 1 lap 4x8'   Stance on foam 3x30 3x30 3x30" 3x30" 3x30'   Stance with EC 3x30 3x30  3x30" 3x30"    On foam 2x30" 3x30"        grapevine 3x8 3x8'  3x8'  3x8'                                                   Ther Ex        Nustep L2  10  L2   10 L2 10' L2 10' 10 min L2    lunges     3x8'   TR/HR 20 20x 20x 20x  20x    Standing SLR 3-way 2x10 R 2# / L3# 2x10  R 2#, L 3# 2x10 ea R 2#/ L 3# 2x10 R 2# / L3# 2x10    LAQ 2x10 R 2#/ L3# 2x10  R 2# L 3# 3" 2x10 R 2#/ L 3# 2x10 R2#/L3# 2x10   Seated Marches 2x10 R2# / L3#   2x10  R 2#, L 3# 2x10 R 2#/ L 3#  2x10 R 2#/ L 3#    Seated HS curls with TB 2x10 GTB 2x10 GTB 2x10 RTB 2x10 GTB 2x10    Sup/Pron Roller        Bicep Curls 2#  2x10  2#  2x10 2# 2x10 2# 2x10 2# cuff wgt 2x10    Right Side Shrugs with TB  RTB 2x10 RTB 2x10     Seated hip abd  GTB 2x10 GTB 2x10 GTB 2x10  GTB 2x10    squat  2x10  2x10 2x10 2x10   pulleys 5 5 min  5 min 5 min 5 min    Ther Activity                        Gait Training Modalities

## 2022-06-21 ENCOUNTER — OFFICE VISIT (OUTPATIENT)
Dept: PHYSICAL THERAPY | Facility: CLINIC | Age: 57
End: 2022-06-21
Payer: COMMERCIAL

## 2022-06-21 DIAGNOSIS — R53.1 RIGHT SIDED WEAKNESS: Primary | ICD-10-CM

## 2022-06-21 DIAGNOSIS — R26.9 ABNORMAL GAIT: ICD-10-CM

## 2022-06-21 DIAGNOSIS — Z86.73 HISTORY OF STROKE: ICD-10-CM

## 2022-06-21 PROCEDURE — 97110 THERAPEUTIC EXERCISES: CPT

## 2022-06-21 PROCEDURE — 97530 THERAPEUTIC ACTIVITIES: CPT

## 2022-06-21 PROCEDURE — 97112 NEUROMUSCULAR REEDUCATION: CPT

## 2022-06-21 NOTE — PROGRESS NOTES
Daily Note     Today's date: 2022  Patient name: Yue Renae  : 1965  MRN: [id number]  Referring provider: Live Rincon, PT  Dx:   Encounter Diagnosis     ICD-10-CM    1  Right sided weakness  R53 1    2  History of stroke  Z86 73    3  Abnormal gait  R26 9        Start Time: 0801  Stop Time: 0900  Total time in clinic (min): 59 minutes    Subjective: Cecille Dave states that she is doing well prior to start of session  She reports no new changes at this time  Objective: See treatment diary below      Assessment: Cecille Dave responded well to treatment  Session initiated on on NuStep  Good endurance noted throughout session  Good form noted t/o session  Patient would benefit from continued PT  Plan: Continue per plan of care  Progress treatment as tolerated          Manuals 6/10/2022 6/14/22 6/17/22 2022   6/7   Right Shoulder and wrist PROM and stretch                                Neuro Re-Ed         MTP/LTP        Side-Stepping 3x30 3x30  3x30' 3x30' braiding 2# on (L) 3# on (R)  3x30"   Tandem walk on airex 4x8 On floor 4x8'  On floor 4x8' On floor 4x8' 4x8'   Stance on foam 3x30 3x30 3x30"  3x30'   Stance with EC 3x30 3x30  3x30"  3x30"        grapevine 3x8 3x8'  3x8' 3x8' 3x8'   Supine bic  kicks    2x10    AROM Flex (R) supine    3x10    bridges    2x10    Cane flexion    2x10    Step overs (roll) lat, front/back    2x10 R 2#, L 3# 2x10                            Ther Ex        Nustep L2  10  L2   10 L2 10' L2 10' 10 min L2    lunges    R 2#, L 3# 2x10 3x8'   TR/HR 20 20x 20x R 2#, L 3# 2x10  20x    Standing SLR 3-way 2x10 R 2# / L3# 2x10  R 2#, L 3# 2x10 ea R 2#, L 3# 2x10 ea R 2# / L3# 2x10    LAQ 2x10 R 2#/ L3# 2x10  R 2# L 3# 3" 2x10 3" 2x10 R2#/L3# 2x10   Seated Marches 2x10 R2# / L3#   2x10  R 2#, L 3# 2x10 Standing   R 2#, L 3# 2x10 R 2#/ L 3#    Seated HS curls with TB 2x10 GTB 2x10 GTB 2x10 BTB R 2#, L 3# 2x10 GTB 2x10    Sup/Pron Roller    2# PRON/ SUP    Bicep Curls 2#  2x10  2# 2x10 2# 2x10 + FLEX 2# 2x10 2# cuff wgt 2x10    Right Side Shrugs with TB  RTB 2x10 RTB 2x10 BTB 2x10    Seated hip abd  GTB 2x10 GTB 2x10 BTB 2x10 GTB 2x10    squat  2x10  2x10 R 2#, L 3# 2x10 2x10   pulleys 5 5 min  5 min 5min 5 min    UBE    5 min    Cat Cow    2x10 w/ hand OP                            Ther Activity        Steps    2# on (L) 3# on (R) 5 laps            Gait Training                        Modalities

## 2022-06-24 ENCOUNTER — OFFICE VISIT (OUTPATIENT)
Dept: PHYSICAL THERAPY | Facility: CLINIC | Age: 57
End: 2022-06-24
Payer: COMMERCIAL

## 2022-06-24 DIAGNOSIS — R53.1 RIGHT SIDED WEAKNESS: Primary | ICD-10-CM

## 2022-06-24 PROCEDURE — 97110 THERAPEUTIC EXERCISES: CPT

## 2022-06-24 PROCEDURE — 97112 NEUROMUSCULAR REEDUCATION: CPT

## 2022-06-24 PROCEDURE — 97530 THERAPEUTIC ACTIVITIES: CPT

## 2022-06-24 NOTE — PROGRESS NOTES
Daily Note     Today's date: 2022  Patient name: Hortensia Spatz  : 1965  MRN: [id number]  Referring provider: Dayron Marsh PT  Dx:   Encounter Diagnosis     ICD-10-CM    1  Right sided weakness  R53 1                   Subjective: Patient reports having some pain this week, secondary to the weather  Objective: See treatment diary below      Assessment: Tolerated treatment well  Patient exhibited good technique with therapeutic exercises      Plan: Continue per plan of care        Manuals 6/10/2022 6/14/22 6/17/22 2022   6/24/22   Right Shoulder and wrist PROM and stretch                                Neuro Re-Ed         MTP/LTP        Side-Stepping 3x30 3x30  3x30' 3x30' braiding 2# on (L) 3# on (R)  3x30"    Tandem walk  4x8 On floor 4x8'  On floor 4x8' On floor 4x8' 4x8'   Stance on foam 3x30 3x30 3x30"  3x30'   Stance with EC 3x30 3x30  3x30"  3x30"        grapevine 3x8 3x8'  3x8' 3x8' 3x8'   Supine bic  kicks    2x10 2x10   AROM Flex (R) supine    3x10 3x10   bridges    2x10 2x10   Cane flexion    2x10 2x10   Step overs (roll) lat, front/back    2x10 R 2#, L 3# 2x10 2x10 R 2#   3# 2x10 L                           Ther Ex        Nustep L2  10  L2   10 L2 10' L2 10' 10 min L2    lunges    R 2#, L 3# 2x10 R 2# L 3# 2x10   TR/HR 20 20x 20x R 2#, L 3# 2x10  2x10   Standing SLR 3-way 2x10 R 2# / L3# 2x10  R 2#, L 3# 2x10 ea R 2#, L 3# 2x10 ea R 2# / L3# 2x10    LAQ 2x10 R 2#/ L3# 2x10  R 2# L 3# 3" 2x10 3" 2x10 R2#/L3# 2x10   Seated Marches 2x10 R2# / L3#   2x10  R 2#, L 3# 2x10 Standing   R 2#, L 3# 2x10 R 2#/ L 3#   2x10   Seated HS curls with TB 2x10 GTB 2x10 GTB 2x10 BTB R 2#, L 3# 2x10 BTB 2x10    Sup/Pron Roller    2# PRON/ SUP 2# 2x10   Bicep Curls 2#  2x10  2#  2x10 2# 2x10 + FLEX 2# 2x10 2# cuff wgt 2x10    Right Side Shrugs with TB  RTB 2x10 RTB 2x10 BTB 2x10 btb 2x10   Seated hip abd  GTB 2x10 GTB 2x10 BTB 2x10 GTB 2x10    squat  2x10  2x10 R 2#, L 3# 2x10 2x10   pulleys 5 5 min  5 min 5min 5 min    UBE    5 min 5 min   Cat Cow    2x10 w/ hand OP 2x10 w hand OP                           Ther Activity        Steps    2# on (L) 3# on (R) 5 laps            Gait Training                        Modalities

## 2022-06-28 ENCOUNTER — OFFICE VISIT (OUTPATIENT)
Dept: PHYSICAL THERAPY | Facility: CLINIC | Age: 57
End: 2022-06-28
Payer: COMMERCIAL

## 2022-06-28 DIAGNOSIS — R53.1 RIGHT SIDED WEAKNESS: Primary | ICD-10-CM

## 2022-06-28 DIAGNOSIS — Z86.73 HISTORY OF STROKE: ICD-10-CM

## 2022-06-28 DIAGNOSIS — R26.9 ABNORMAL GAIT: ICD-10-CM

## 2022-06-28 PROCEDURE — 97112 NEUROMUSCULAR REEDUCATION: CPT | Performed by: PHYSICAL THERAPIST

## 2022-06-28 PROCEDURE — 97110 THERAPEUTIC EXERCISES: CPT | Performed by: PHYSICAL THERAPIST

## 2022-06-28 NOTE — PROGRESS NOTES
Daily Note     Today's date: 2022  Patient name: Phyllis Solo  : 1965  MRN: [id number]  Referring provider: Toñito Alcantar, PT  Dx:   Encounter Diagnosis     ICD-10-CM    1  Right sided weakness  R53 1    2  History of stroke  Z86 73    3  Abnormal gait  R26 9                   Subjective: Patient reports feeling better with stretching  Objective: See treatment diary below      Assessment: Tolerated treatment well  Patient exhibited good technique with therapeutic exercises      Plan: Continue per plan of care        Manuals 2022   Right Shoulder and wrist PROM and stretch                                Neuro Re-Ed         MTP/LTP        Side-Stepping 3x30 3x30  3x30' 3x30' braiding 2# on (L) 3# on (R)  3x30"    Tandem walk  4x8 On floor 4x8'  On floor 4x8' On floor 4x8' 4x8'   Stance on foam 3x30 3x30 3x30"  3x30'   Stance with EC 3x30 3x30  3x30"  3x30"        grapevine 3x8 3x8'  3x8' 3x8' 3x8'   Supine bic  kicks 2x10   2x10 2x10   AROM Flex (R) supine 3x10   3x10 3x10   bridges 2x10   2x10 2x10   Cane flexion 2x10   2x10 2x10   Step overs (roll) lat, front/back 2x10 R 2#  2x10 L 3#   2x10 R 2#, L 3# 2x10 2x10 R 2#   3# 2x10 L                           Ther Ex        Nustep L2  10  L2   10 L2 10' L2 10' 10 min L2    lunges R 2#, L 3# 2x10   R 2#, L 3# 2x10 R 2# L 3# 2x10   TR/HR 20 20x 20x R 2#, L 3# 2x10  2x10   Standing SLR 3-way 2x10 R 2# / L3# 2x10  R 2#, L 3# 2x10 ea R 2#, L 3# 2x10 ea R 2# / L3# 2x10    LAQ 2x10 R 2#/ L3# 2x10  R 2# L 3# 3" 2x10 3" 2x10 R2#/L3# 2x10   Seated Marches 2x10 R2# / L3#   2x10  R 2#, L 3# 2x10 Standing   R 2#, L 3# 2x10 R 2#/ L 3#   2x10   Seated HS curls with TB 2x10 GTB 2x10 GTB 2x10 BTB R 2#, L 3# 2x10 BTB 2x10    Sup/Pron Roller    2# PRON/ SUP 2# 2x10   Bicep Curls 2#  2x10  2#  2x10 2# 2x10 + FLEX 2# 2x10 2# cuff wgt 2x10    Right Side Shrugs with TB BTB 2x10 RTB 2x10 RTB 2x10 BTB 2x10 btb 2x10   Seated hip abd GTB 2x10 GTB 2x10 GTB 2x10 BTB 2x10 GTB 2x10    squat 2x10 2x10  2x10 R 2#, L 3# 2x10 2x10   pulleys 5 5 min  5 min 5min 5 min    UBE 5 min   5 min 5 min   Cat Cow 2x10 w/ hand OP   2x10 w/ hand OP 2x10 w hand OP                           Ther Activity        Steps    2# on (L) 3# on (R) 5 laps            Gait Training                        Modalities

## 2022-07-01 ENCOUNTER — OFFICE VISIT (OUTPATIENT)
Dept: PHYSICAL THERAPY | Facility: CLINIC | Age: 57
End: 2022-07-01
Payer: COMMERCIAL

## 2022-07-01 DIAGNOSIS — R53.1 RIGHT SIDED WEAKNESS: Primary | ICD-10-CM

## 2022-07-01 PROCEDURE — 97110 THERAPEUTIC EXERCISES: CPT

## 2022-07-01 PROCEDURE — 97112 NEUROMUSCULAR REEDUCATION: CPT

## 2022-07-01 NOTE — PROGRESS NOTES
Daily Note     Today's date: 2022  Patient name: Dolly Napier  : 1965  MRN: [id number]  Referring provider: Kishore Jones PT  Dx:   Encounter Diagnosis     ICD-10-CM    1  Right sided weakness  R53 1                   Subjective: Patient reports getting stronger on the right  Objective: See treatment diary below      Assessment: Tolerated treatment well  Patient exhibited good technique with therapeutic exercises  Balance is slowly improving  Plan: Continue per plan of care        Manuals 2022   Right Shoulder and wrist PROM and stretch                                Neuro Re-Ed         MTP/LTP        Side-Stepping 3x30 3x30 braiding 3x30' 3x30' braiding 2# on (L) 3# on (R)  3x30"    Tandem walk  4x8 On floor 4x8'  On floor 4x8' On floor 4x8' 4x8'   Stance on foam 3x30 3x30 3x30"  3x30'   Stance with EC 3x30 3x30  3x30"  3x30"        grapevine 3x8 3x8'  3x8' 3x8' 3x8'   Supine bic  kicks 2x10 2x10  2x10 2x10   AROM Flex (R) supine 3x10 2x10  3x10 3x10   bridges 2x10 2x10  2x10 2x10   Cane flexion 2x10 2x10  2x10 2x10   Step overs (roll) lat, front/back 2x10 R 2#  2x10 L 3# 2x10 varying heights   2x10 R 2#, L 3# 2x10 2x10 R 2#   3# 2x10 L   BOSU step overs with balance hold  10x with 5 second hold                      Ther Ex        Nustep L2  10  L3   10 min L2 10' L2 10' 10 min L2    lunges R 2#, L 3# 2x10 R 2# L 3# 2x10  R 2#, L 3# 2x10 R 2# L 3# 2x10   TR/HR 20 20x 20x R 2#, L 3# 2x10  2x10   Standing SLR 3-way 2x10 R 2# / L3# 2x10  R 2#, L 3# 2x10 ea R 2#, L 3# 2x10 ea R 2# / L3# 2x10    LAQ 2x10 R 2#/ L3# 2x10  R 2# L 3# 3" 2x10 3" 2x10 R2#/L3# 2x10   Seated Marches 2x10 R2# / L3#   2x10  R 2#, L 3# 2x10 Standing   R 2#, L 3# 2x10 R 2#/ L 3#   2x10   Seated HS curls with TB 2x10 BTB 2x10 GTB 2x10 BTB R 2#, L 3# 2x10 BTB 2x10    Sup/Pron Roller    2# PRON/ SUP 2# 2x10   Bicep Curls 2#  2x10  2#  2x10 2# 2x10 + FLEX 2# 2x10 2# cuff wgt 2x10 Right Side Shrugs with TB BTB 2x10 BTB 2x10 RTB 2x10 BTB 2x10 btb 2x10   Seated hip abd GTB 2x10 BTB 2x10 GTB 2x10 BTB 2x10 GTB 2x10    squat 2x10 2x10  2x10 R 2#, L 3# 2x10 2x10   pulleys 5 5 min  5 min 5min 5 min    UBE 5 min 5 min  5 min 5 min   Cat Cow 2x10 w/ hand OP 2x10 w/ hand OP  2x10 w/ hand OP 2x10 w hand OP                           Ther Activity        Steps    2# on (L) 3# on (R) 5 laps            Gait Training                        Modalities

## 2022-07-05 ENCOUNTER — OFFICE VISIT (OUTPATIENT)
Dept: PHYSICAL THERAPY | Facility: CLINIC | Age: 57
End: 2022-07-05
Payer: COMMERCIAL

## 2022-07-05 DIAGNOSIS — R53.1 RIGHT SIDED WEAKNESS: Primary | ICD-10-CM

## 2022-07-05 PROCEDURE — 97112 NEUROMUSCULAR REEDUCATION: CPT

## 2022-07-05 PROCEDURE — 97110 THERAPEUTIC EXERCISES: CPT

## 2022-07-05 NOTE — LETTER
2022    Daxa Christian MD  [facility] [address]  [address] [address]    Patient: Jam Rodriguez   YOB: 1965   Date of Visit: 2022     Encounter Diagnosis     ICD-10-CM    1  Right sided weakness  R53 1        Dear Dr Olya Steele: Thank you for your recent referral of Jam Rodriguez  Please review the attached evaluation summary from Christian's recent visit  Please verify that you agree with the plan of care by signing the attached order  If you have any questions or concerns, please do not hesitate to call  I sincerely appreciate the opportunity to share in the care of one of your patients and hope to have another opportunity to work with you in the near future  Sincerely,    Gianna Goodrich PT      Referring Provider:      I certify that I have read the below Plan of Care and certify the need for these services furnished under this plan of treatment while under my care  Daxa Christian MD  [facility] [address]  [address] [address]  Via Fax: [phone]          Daily Note     Today's date: 2022  Patient name: Jam Rodriguez  : 1965  MRN: [id number]  Referring provider: Cara Brown PT  Dx:   Encounter Diagnosis     ICD-10-CM    1  Right sided weakness  R53 1                   Subjective: Patient reports no new complaints today  Objective: See treatment diary below      Assessment: Tolerated treatment well  Patient exhibited good technique with therapeutic exercises      Plan: Continue per plan of care        Manuals 2022   Right Shoulder and wrist PROM and stretch                                Neuro Re-Ed         MTP/LTP        Side-Stepping 3x30 3x30 braiding 3x30' braiding 3x30' braiding 2# on (L) 3# on (R)  3x30"    Tandem walk  4x8 On floor 4x8'  On floor 4x8' On floor 4x8' 4x8'   Stance on foam 3x30 3x30 3x30"  3x30'   Stance with EC 3x30 3x30  3x30"  3x30" grapevine 3x8 3x8'  3x8' 3x8' 3x8'   Supine bic  kicks 2x10 2x10 2x10 2x10 2x10   AROM Flex (R) supine 3x10 2x10 2x10 3x10 3x10   bridges 2x10 2x10 2x10 2x10 2x10   Cane flexion 2x10 2x10 2x10 2x10 2x10   Step overs (roll) lat, front/back 2x10 R 2#  2x10 L 3# 2x10 varying heights  2x10 varying heights 2x10 R 2#, L 3# 2x10 2x10 R 2#   3# 2x10 L   BOSU step overs with balance hold  10x with 5 second hold 10x with 5" hold                     Ther Ex        Nustep L2  10  L3   10 min L2 10' L2 10' 10 min L2    lunges R 2#, L 3# 2x10 R 2# L 3# 2x10  R 2#, L 3# 2x10 R 2# L 3# 2x10   TR/HR 20 20x 20x R 2#, L 3# 2x10  2x10   Standing SLR 3-way 2x10 R 2# / L3# 2x10   R 2#, L 3# 2x10 ea R 2# / L3# 2x10    LAQ 2x10 R 2#/ L3# 2x10   3" 2x10 R2#/L3# 2x10   Seated Marches 2x10 R2# / L3#   2x10   Standing   R 2#, L 3# 2x10 R 2#/ L 3#   2x10   Seated HS curls with TB 2x10 BTB 2x10  BTB R 2#, L 3# 2x10 BTB 2x10    Sup/Pron Roller    2# PRON/ SUP 2# 2x10   Bicep Curls 2#  2x10  2#  2x10 2# 2x10 + FLEX 2# 2x10 2# cuff wgt 2x10    Right Side Shrugs with TB BTB 2x10 BTB 2x10 RTB 2x10 BTB 2x10 btb 2x10   Seated hip abd GTB 2x10 BTB 2x10 GTB 2x10 BTB 2x10 GTB 2x10    squat 2x10 2x10  2x10 R 2#, L 3# 2x10 2x10   pulleys 5 5 min  5 min 5min 5 min    UBE 5 min 5 min 5 min 5 min 5 min   Cat Cow 2x10 w/ hand OP 2x10 w/ hand OP  2x10 w/ hand OP 2x10 w hand OP                           Ther Activity        Steps    2# on (L) 3# on (R) 5 laps            Gait Training                        Modalities                                                                    Attestation signed by Priya Parker PT at 2022  7:06 AM:  I supervised the visit  We discussed the case to ensure appropriate continuation and progression of care and I reviewed the documentation       PT Re-Evaluation     Today's date: 2022  Patient name: Sully Ramos  : 1965  MRN: [id number]  Referring provider: Renata De Leon, PT  Dx:   Encounter Diagnosis ICD-10-CM    1  Right sided weakness  R53 1        Start Time:   Stop Time:   Total time in clinic (min): 67 minutes    Assessment  Assessment details: Patient continues to make good progress with PT POC  Patient is showing improvement with UE and LE strength  Patient also progressing well with dynamic balance activities  PT notes significant improvement in endurance activities  Patient would benefit from continued PT intervention to address limitations in order to maximize functional independence  Impairments: abnormal gait, abnormal muscle tone, abnormal or restricted ROM, activity intolerance, impaired balance, impaired physical strength, lacks appropriate home exercise program, safety issue, weight-bearing intolerance and poor posture     Goals  ST  Initiate HEP- Met   2  Patient to increase right UE and LE strength to 4-/5 t/o in 4 weeks- Progressing    LT  Patient to increase right UE and LE strength to 4+/5 in 8 weeks- Progressing  2  Patient to improve ambulation and balance to [facility] in 8 weeks- Progressing  3   Patient to increase endurance to [facility] in 8 weeks- Progressing    Plan  Patient would benefit from: skilled physical therapy  Planned modality interventions: cryotherapy and thermotherapy: hydrocollator packs  Other planned modality interventions: Modalities PRN  Planned therapy interventions: IADL retraining, ADL retraining, manual therapy, balance, balance/weight bearing training, neuromuscular re-education, patient education, postural training, strengthening, stretching, therapeutic activities, therapeutic exercise, therapeutic training, transfer training, gait training, functional ROM exercises, flexibility, graded activity, graded exercise and home exercise program  Frequency: 2x week  Duration in visits: 8  Duration in weeks: 4  Treatment plan discussed with: patient        Subjective Evaluation    History of Present Illness  Date of onset: 2021  Pain  Aggravating factors: overhead activity, lifting and walking  Progression: improved    Social Support  Stairs in house: yes   15    Patient Goals  Patient goal: To work on my arm and get some strength back in my legs        Objective     Static Posture     Shoulders  Depressed  Neurological Testing     Sensation     Shoulder   Left Shoulder   Intact: light touch    Right Shoulder   Intact: light touch    Wrist/Hand   Left   Intact: light touch    Right   Diminished: light touch  Paresthesia: light touch    Active Range of Motion   Left Shoulder   Normal active range of motion    Right Shoulder   Flexion: 50 degrees   Abduction: 85 degrees     Left Elbow   Normal active range of motion    Right Elbow   Flexion: WFL  Extension: WFL    Additional Active Range of Motion Details  Right wrist PROM limited 50%  Patient with limited finger extension, she states her hand and fingers will continuously flex up on her    Passive Range of Motion   Left Shoulder   Normal passive range of motion    Right Shoulder   Flexion: 90 degrees   Abduction: 100 degrees     Right Wrist   Normal passive range of motion    Additional Passive Range of Motion Details  Full PROM in right hand    Strength/Myotome Testing     Right Shoulder     Planes of Motion   Flexion: 2+   Abduction: 3-   External rotation at 0°: 2+   Internal rotation at 0°: 3-     Right Elbow   Flexion: 3  Extension: 3  Forearm supination: 3  Forearm pronation: 3    Left Wrist/Hand   Normal wrist strength    Right Wrist/Hand   Wrist extension: 3-  Wrist flexion: 3-  Radial deviation: 3-  Ulnar deviation: 3-    Ambulation     Ambulation: Level Surfaces   Ambulation without assistive device: independent    Observational Gait   Gait: asymmetric   Decreased walking speed, stride length, right stance time and right step length     Right foot contact pattern: foot flat    Additional Observational Gait Details  Patient using AFO brace for foot drop on right                Precautions Previous CVA with right sided weakness       Manuals 5/3/22       Right Shoulder and wrist PROM and stretch                                Neuro Re-Ed         MTP/LTP        Side-Stepping        Stance on foam        Stance with EC                                                                Ther Ex        Nustep        UBE        TR/HR        Standing SLR 3-way        LAQ        Seated Marches        Seated HS curls with TB        Sup/Pron Roller        Bicep Curls        Wrist AROM                        Ther Activity                        Gait Training                        Modalities

## 2022-07-05 NOTE — PROGRESS NOTES
Daily Note     Today's date: 2022  Patient name: Loren Gallardo  : 1965  MRN: [id number]  Referring provider: Consuelo Bauman PT  Dx:   Encounter Diagnosis     ICD-10-CM    1  Right sided weakness  R53 1                   Subjective: Patient reports no new complaints today  Objective: See treatment diary below      Assessment: Tolerated treatment well  Patient exhibited good technique with therapeutic exercises      Plan: Continue per plan of care        Manuals 2022   Right Shoulder and wrist PROM and stretch                                Neuro Re-Ed         MTP/LTP        Side-Stepping 3x30 3x30 braiding 3x30' braiding 3x30' braiding 2# on (L) 3# on (R)  3x30"    Tandem walk  4x8 On floor 4x8'  On floor 4x8' On floor 4x8' 4x8'   Stance on foam 3x30 3x30 3x30"  3x30'   Stance with EC 3x30 3x30  3x30"  3x30"        grapevine 3x8 3x8'  3x8' 3x8' 3x8'   Supine bic  kicks 2x10 2x10 2x10 2x10 2x10   AROM Flex (R) supine 3x10 2x10 2x10 3x10 3x10   bridges 2x10 2x10 2x10 2x10 2x10   Cane flexion 2x10 2x10 2x10 2x10 2x10   Step overs (roll) lat, front/back 2x10 R 2#  2x10 L 3# 2x10 varying heights  2x10 varying heights 2x10 R 2#, L 3# 2x10 2x10 R 2#   3# 2x10 L   BOSU step overs with balance hold  10x with 5 second hold 10x with 5" hold                     Ther Ex        Nustep L2  10  L3   10 min L2 10' L2 10' 10 min L2    lunges R 2#, L 3# 2x10 R 2# L 3# 2x10  R 2#, L 3# 2x10 R 2# L 3# 2x10   TR/HR 20 20x 20x R 2#, L 3# 2x10  2x10   Standing SLR 3-way 2x10 R 2# / L3# 2x10   R 2#, L 3# 2x10 ea R 2# / L3# 2x10    LAQ 2x10 R 2#/ L3# 2x10   3" 2x10 R2#/L3# 2x10   Seated Marches 2x10 R2# / L3#   2x10   Standing   R 2#, L 3# 2x10 R 2#/ L 3#   2x10   Seated HS curls with TB 2x10 BTB 2x10  BTB R 2#, L 3# 2x10 BTB 2x10    Sup/Pron Roller    2# PRON/ SUP 2# 2x10   Bicep Curls 2#  2x10  2#  2x10 2# 2x10 + FLEX 2# 2x10 2# cuff wgt 2x10    Right Side Shrugs with TB BTB 2x10 BTB 2x10 RTB 2x10 BTB 2x10 btb 2x10   Seated hip abd GTB 2x10 BTB 2x10 GTB 2x10 BTB 2x10 GTB 2x10    squat 2x10 2x10  2x10 R 2#, L 3# 2x10 2x10   pulleys 5 5 min  5 min 5min 5 min    UBE 5 min 5 min 5 min 5 min 5 min   Cat Cow 2x10 w/ hand OP 2x10 w/ hand OP  2x10 w/ hand OP 2x10 w hand OP                           Ther Activity        Steps    2# on (L) 3# on (R) 5 laps            Gait Training                        Modalities

## 2022-07-08 ENCOUNTER — APPOINTMENT (OUTPATIENT)
Dept: PHYSICAL THERAPY | Facility: CLINIC | Age: 57
End: 2022-07-08
Payer: COMMERCIAL

## 2022-07-12 ENCOUNTER — APPOINTMENT (OUTPATIENT)
Dept: PHYSICAL THERAPY | Facility: CLINIC | Age: 57
End: 2022-07-12
Payer: COMMERCIAL

## 2022-07-15 ENCOUNTER — APPOINTMENT (OUTPATIENT)
Dept: PHYSICAL THERAPY | Facility: CLINIC | Age: 57
End: 2022-07-15
Payer: COMMERCIAL

## 2022-07-19 ENCOUNTER — APPOINTMENT (OUTPATIENT)
Dept: PHYSICAL THERAPY | Facility: CLINIC | Age: 57
End: 2022-07-19
Payer: COMMERCIAL

## 2022-07-19 ENCOUNTER — OFFICE VISIT (OUTPATIENT)
Dept: PHYSICAL THERAPY | Facility: CLINIC | Age: 57
End: 2022-07-19
Payer: COMMERCIAL

## 2022-07-19 DIAGNOSIS — Z86.73 HISTORY OF STROKE: ICD-10-CM

## 2022-07-19 DIAGNOSIS — R53.1 RIGHT SIDED WEAKNESS: Primary | ICD-10-CM

## 2022-07-19 PROCEDURE — 97110 THERAPEUTIC EXERCISES: CPT

## 2022-07-19 PROCEDURE — 97112 NEUROMUSCULAR REEDUCATION: CPT

## 2022-07-19 NOTE — PROGRESS NOTES
Daily Note     Today's date: 2022  Patient name: Ho Rogers  : 1965  MRN: [id number]  Referring provider: Jimmie Nelson PT  Dx:   Encounter Diagnosis     ICD-10-CM    1  Right sided weakness  R53 1    2  History of stroke  Z86 73                   Subjective: Patient reports right side weakness continues to improve  Patient reports balance is improving as well  Objective: See treatment diary below      Assessment: Tolerated treatment well  Patient exhibited good technique with therapeutic exercises      Plan: Continue per plan of care        Manuals 2022   Right Shoulder and wrist PROM and stretch                                Neuro Re-Ed         MTP/LTP        Side-Stepping 3x30 3x30 braiding 3x30' braiding 3x30' braiding 2# on (L) 3# on (R)  3x30"    Tandem walk  4x8 On floor 4x8'  On floor 4x8' On floor 4x8' 4x8'   Stance on foam 3x30 3x30 3x30"  3x30'   Stance with EC 3x30 3x30  3x30"  3x30"        grapevine 3x8 3x8'  3x8' 3x8' 3x8'   Supine bic  kicks 2x10 2x10 2x10 2x10 2x10   AROM Flex (R) supine 3x10 2x10 2x10 3x10 3x10   bridges 2x10 2x10 2x10 2x10 2x10   Cane flexion 2x10 2x10 2x10 2x10 2x10   Step overs (roll) lat, front/back 2x10 R 2#  2x10 L 3# 2x10 varying heights  2x10 varying heights 2x10 varying heights 2x10 R 2#   3# 2x10 L   BOSU step overs with balance hold  10x with 5 second hold 10x with 5" hold                     Ther Ex        Nustep L2  10  L3   10 min L2 10' L2 10' 10 min L2    lunges R 2#, L 3# 2x10 R 2# L 3# 2x10  R 2#, L 3# 2x10 R 2# L 3# 2x10   TR/HR 20 20x 20x R 2#, L 3# 2x10  2x10   Standing SLR 3-way 2x10 R 2# / L3# 2x10   R 2#, L 3# 2x10 ea R 2# / L3# 2x10    LAQ 2x10 R 2#/ L3# 2x10   3" 2x10 R2#/L3# 2x10   Seated Marches 2x10 R2# / L3#   2x10   Standing   R 2#, L 3# 2x10 R 2#/ L 3#   2x10   Seated HS curls with TB 2x10 BTB 2x10  BTB R 2#, L 3# 2x10 BTB 2x10    Sup/Pron Roller    2# PRON/ SUP 2# 2x10   Bicep Curls 2# 2x10  2#  2x10 2# 2x10 + FLEX 2# 2x10 2# cuff wgt 2x10    Right Side Shrugs with TB BTB 2x10 BTB 2x10 RTB 2x10 BTB 2x10 btb 2x10   Seated hip abd GTB 2x10 BTB 2x10 GTB 2x10 BTB 2x10 GTB 2x10    squat 2x10 2x10  2x10 R 2#, L 3# 2x10 2x10   pulleys 5 5 min  5 min 5min 5 min    UBE 5 min 5 min 5 min 5 min 5 min   Cat Cow 2x10 w/ hand OP 2x10 w/ hand OP  2x10 w/ hand OP 2x10 w hand OP                           Ther Activity        Steps                Gait Training                        Modalities

## 2022-07-22 ENCOUNTER — OFFICE VISIT (OUTPATIENT)
Dept: PHYSICAL THERAPY | Facility: CLINIC | Age: 57
End: 2022-07-22
Payer: COMMERCIAL

## 2022-07-22 ENCOUNTER — APPOINTMENT (OUTPATIENT)
Dept: PHYSICAL THERAPY | Facility: CLINIC | Age: 57
End: 2022-07-22
Payer: COMMERCIAL

## 2022-07-22 DIAGNOSIS — R53.1 RIGHT SIDED WEAKNESS: Primary | ICD-10-CM

## 2022-07-22 DIAGNOSIS — Z86.73 HISTORY OF STROKE: ICD-10-CM

## 2022-07-22 PROCEDURE — 97112 NEUROMUSCULAR REEDUCATION: CPT

## 2022-07-22 PROCEDURE — 97110 THERAPEUTIC EXERCISES: CPT

## 2022-07-22 NOTE — PROGRESS NOTES
Daily Note     Today's date: 2022  Patient name: Hermes Barrientos  : 1965  MRN: [id number]  Referring provider: Rush Parsons PT  Dx:   Encounter Diagnosis     ICD-10-CM    1  Right sided weakness  R53 1    2  History of stroke  Z86 73                   Subjective: Patient reports right side remains weak but patient is motivated to keep working on goals  Objective: See treatment diary below      Assessment: Tolerated treatment well  Patient exhibited good technique with therapeutic exercises      Plan: Continue per plan of care        Manuals 2022   Right Shoulder and wrist PROM and stretch                                Neuro Re-Ed         MTP/LTP        Side-Stepping 3x30 3x30 braiding 3x30' braiding 3x30' braiding 2# on (L) 3# on (R)  3x30"    Tandem walk  4x8 On floor 4x8'  On floor 4x8' On floor 4x8' 4x8'   Stance on foam 3x30 3x30 3x30"  3x30'   Stance with EC 3x30 3x30  3x30"  3x30"        grapevine 3x8 3x8'  3x8' 3x8' 3x8'   Supine bic  kicks 2x10 2x10 2x10 2x10 2x10   AROM Flex (R) supine 3x10 2x10 2x10 3x10 3x10   bridges 2x10 2x10 2x10 2x10 2x10   Cane flexion 2x10 2x10 2x10 2x10 2x10   Step overs (roll) lat, front/back 2x10 R 2#  2x10 L 3# 2x10 varying heights  2x10 varying heights 2x10 varying heights 2x10 R 2#   3# 2x10 L   BOSU step overs with balance hold  10x with 5 second hold 10x with 5" hold                     Ther Ex        Nustep L2  10  L3   10 min L2 10' L2 10' 10 min L2    lunges R 2#, L 3# 2x10 R 2# L 3# 2x10  R 2#, L 3# 2x10 R 2# L 3# 2x10   TR/HR 20 20x 20x R 2#, L 3# 2x10  2x10   Standing SLR 3-way 2x10 R 2# / L3# 2x10   R 2#, L 3# 2x10 ea R 2# / L3# 2x10    LAQ 2x10 R 2#/ L3# 2x10   3" 2x10 R2#/L3# 2x10   Seated Marches 2x10 R2# / L3#   2x10   Standing   R 2#, L 3# 2x10 R 2#/ L 3#   2x10   Seated HS curls with TB 2x10 BTB 2x10  BTB R 2#, L 3# 2x10 BTB 2x10    Sup/Pron Roller    2# PRON/ SUP 2# 2x10   Bicep Curls 2#  2x10  2#  2x10 2# 2x10 + FLEX 2# 2x10 2# cuff wgt 2x10    Right Side Shrugs with TB BTB 2x10 BTB 2x10 RTB 2x10 BTB 2x10 btb 2x10   Seated hip abd GTB 2x10 BTB 2x10 GTB 2x10 BTB 2x10 BTB 2x10    squat 2x10 2x10  2x10 R 2#, L 3# 2x10 2x10   pulleys 5 5 min  5 min 5min 5 min    UBE 5 min 5 min 5 min 5 min 5 min   Cat Cow 2x10 w/ hand OP 2x10 w/ hand OP  2x10 w/ hand OP 2x10 w hand OP                           Ther Activity        Steps                Gait Training                        Modalities

## 2022-07-26 ENCOUNTER — OFFICE VISIT (OUTPATIENT)
Dept: PHYSICAL THERAPY | Facility: CLINIC | Age: 57
End: 2022-07-26
Payer: COMMERCIAL

## 2022-07-26 ENCOUNTER — APPOINTMENT (OUTPATIENT)
Dept: PHYSICAL THERAPY | Facility: CLINIC | Age: 57
End: 2022-07-26
Payer: COMMERCIAL

## 2022-07-26 DIAGNOSIS — R53.1 RIGHT SIDED WEAKNESS: Primary | ICD-10-CM

## 2022-07-26 DIAGNOSIS — R26.9 ABNORMAL GAIT: ICD-10-CM

## 2022-07-26 DIAGNOSIS — Z86.73 HISTORY OF STROKE: ICD-10-CM

## 2022-07-26 PROCEDURE — 97112 NEUROMUSCULAR REEDUCATION: CPT

## 2022-07-26 PROCEDURE — 97110 THERAPEUTIC EXERCISES: CPT

## 2022-07-26 NOTE — PROGRESS NOTES
PT Re-Evaluation     Today's date: 2022  Patient name: Sharda Mariano  : 1965  MRN: [id number]  Referring provider: Ji Das, PT  Dx:   Encounter Diagnosis     ICD-10-CM    1  Right sided weakness  R53 1        Start Time:   Stop Time:   Total time in clinic (min): 67 minutes    Assessment  Assessment details: Patient continues to make good progress with PT POC  Patient is showing improvement with UE and LE strength  Patient also progressing well with dynamic balance activities  PT notes significant improvement in endurance activities  Patient would benefit from continued PT intervention to address limitations in order to maximize functional independence  Impairments: abnormal gait, abnormal muscle tone, abnormal or restricted ROM, activity intolerance, impaired balance, impaired physical strength, lacks appropriate home exercise program, safety issue, weight-bearing intolerance and poor posture     Goals  ST  Initiate HEP- Met   2  Patient to increase right UE and LE strength to 4-/5 t/o in 4 weeks- Progressing    LT  Patient to increase right UE and LE strength to 4+/5 in 8 weeks- Progressing  2  Patient to improve ambulation and balance to [facility] in 8 weeks- Progressing  3   Patient to increase endurance to [facility] in 8 weeks- Progressing    Plan  Patient would benefit from: skilled physical therapy  Planned modality interventions: cryotherapy and thermotherapy: hydrocollator packs  Other planned modality interventions: Modalities PRN  Planned therapy interventions: IADL retraining, ADL retraining, manual therapy, balance, balance/weight bearing training, neuromuscular re-education, patient education, postural training, strengthening, stretching, therapeutic activities, therapeutic exercise, therapeutic training, transfer training, gait training, functional ROM exercises, flexibility, graded activity, graded exercise and home exercise program  Frequency: 2x week  Duration in visits: 8  Duration in weeks: 4  Treatment plan discussed with: patient        Subjective Evaluation    History of Present Illness  Date of onset: 6/27/2021  Pain  Aggravating factors: overhead activity, lifting and walking  Progression: improved    Social Support  Stairs in house: yes   15    Patient Goals  Patient goal: To work on my arm and get some strength back in my legs        Objective     Static Posture     Shoulders  Depressed  Neurological Testing     Sensation     Shoulder   Left Shoulder   Intact: light touch    Right Shoulder   Intact: light touch    Wrist/Hand   Left   Intact: light touch    Right   Diminished: light touch  Paresthesia: light touch    Active Range of Motion   Left Shoulder   Normal active range of motion    Right Shoulder   Flexion: 50 degrees   Abduction: 85 degrees     Left Elbow   Normal active range of motion    Right Elbow   Flexion: WFL  Extension: WFL    Additional Active Range of Motion Details  Right wrist PROM limited 50%   Patient with limited finger extension, she states her hand and fingers will continuously flex up on her    Passive Range of Motion   Left Shoulder   Normal passive range of motion    Right Shoulder   Flexion: 90 degrees   Abduction: 100 degrees     Right Wrist   Normal passive range of motion    Additional Passive Range of Motion Details  Full PROM in right hand    Strength/Myotome Testing     Right Shoulder     Planes of Motion   Flexion: 2+   Abduction: 3-   External rotation at 0°: 2+   Internal rotation at 0°: 3-     Right Elbow   Flexion: 3  Extension: 3  Forearm supination: 3  Forearm pronation: 3    Left Wrist/Hand   Normal wrist strength    Right Wrist/Hand   Wrist extension: 3-  Wrist flexion: 3-  Radial deviation: 3-  Ulnar deviation: 3-    Ambulation     Ambulation: Level Surfaces   Ambulation without assistive device: independent    Observational Gait   Gait: asymmetric   Decreased walking speed, stride length, right stance time and right step length     Right foot contact pattern: foot flat    Additional Observational Gait Details  Patient using AFO brace for foot drop on right                Precautions Previous CVA with right sided weakness       Manuals 5/3/22       Right Shoulder and wrist PROM and stretch                                Neuro Re-Ed         MTP/LTP        Side-Stepping        Stance on foam        Stance with EC                                                                Ther Ex        Nustep        UBE        TR/HR        Standing SLR 3-way        LAQ        Seated Marches        Seated HS curls with TB        Sup/Pron Roller        Bicep Curls        Wrist AROM                        Ther Activity                        Gait Training                        Modalities

## 2022-07-26 NOTE — PROGRESS NOTES
Daily Note     Today's date: 2022  Patient name: Garrison Shaw  : 1965  MRN: [id number]  Referring provider: Gena Saucedo PT  Dx:   Encounter Diagnosis     ICD-10-CM    1  Right sided weakness  R53 1    2  History of stroke  Z86 73    3  Abnormal gait  R26 9        Start Time: 0810  Stop Time: 0910  Total time in clinic (min): 60 minutes    Subjective: Patient stated doing good today, and offered no complaints at this time  Objective: See treatment diary below      Assessment: Tolerated treatment well  Patient exhibited good technique with therapeutic exercises , as well as good control with standing balance drills  Did well also with increased intensity  Plan: Continue per plan of care        Manuals 22   Right Shoulder and wrist PROM and stretch                                Neuro Re-Ed         MTP/LTP        Side-Stepping 3x30 3x30 braiding 3x30' braiding 3x30' braiding 2# on (L) 3# on (R)  3x30"    Tandem walk  4x8 On floor 4x8'  On floor 4x8' On floor 4x8' 4x8'   Stance on foam 3x30 3x30 3x30"  3x30'   Stance with EC 3x30 3x30  3x30"  3x30"        grapevine 3x8 3x8'  3x8' 3x8' 3x8'   Supine bic  kicks 2x10 2x10 2x10 2x10 2x10   AROM Flex (R) supine 3x10 2x10 2x10 3x10 3x10   bridges 2x10 2x10 2x10 2x10 2x10   Cane flexion 2x10 2x10 2x10 2x10 2x10   Step overs (roll) lat, front/back 2x10 R 3#  2x10 L 4# 2x10 varying heights  2x10 varying heights 2x10 varying heights 2x10 R 2#   3# 2x10 L   BOSU step overs with balance hold  10x with 5 second hold 10x with 5" hold                     Ther Ex        Nustep L2  10  L3   10 min L2 10' L2 10' 10 min L2    lunges R 3#, L 4# 2x10 R 2# L 3# 2x10  R 2#, L 3# 2x10 R 2# L 3# 2x10   TR/HR 20 20x 20x R 2#, L 3# 2x10  2x10   Standing SLR 3-way 2x10 R 3#/L4# R 2# / L3# 2x10   R 2#, L 3# 2x10 ea R 2# / L3# 2x10    LAQ 2x10 R 3#/L4# R 2#/ L3# 2x10   3" 2x10 R2#/L3# 2x10   Seated Marches 2x10 R3#/L4# R2# / L3#   2x10 Standing   R 2#, L 3# 2x10 R 2#/ L 3#   2x10   Seated HS curls with TB 2x10 BTB BTB 2x10  BTB R 2#, L 3# 2x10 BTB 2x10    Sup/Pron Roller    2# PRON/ SUP 2# 2x10   Bicep Curls 2#  2x10  2#  2x10 2# 2x10 + FLEX 2# 2x10 2# cuff wgt 2x10    Right Side Shrugs with TB BTB 2x10 BTB 2x10 RTB 2x10 BTB 2x10 btb 2x10   Seated hip abd GTB 2x10 BTB 2x10 GTB 2x10 BTB 2x10 BTB 2x10    squat 2x10 2x10  2x10 R 2#, L 3# 2x10 2x10   pulleys 5 min  5 min  5 min 5min 5 min    UBE 5 min 5 min 5 min 5 min 5 min   Cat Cow 2x10 w/ hand OP 2x10 w/ hand OP  2x10 w/ hand OP 2x10 w hand OP                           Ther Activity        Steps                Gait Training                        Modalities

## 2022-07-28 ENCOUNTER — APPOINTMENT (OUTPATIENT)
Dept: PHYSICAL THERAPY | Facility: CLINIC | Age: 57
End: 2022-07-28
Payer: COMMERCIAL

## 2022-07-28 ENCOUNTER — OFFICE VISIT (OUTPATIENT)
Dept: PHYSICAL THERAPY | Facility: CLINIC | Age: 57
End: 2022-07-28
Payer: COMMERCIAL

## 2022-07-28 DIAGNOSIS — R53.1 RIGHT SIDED WEAKNESS: Primary | ICD-10-CM

## 2022-07-28 DIAGNOSIS — Z86.73 HISTORY OF STROKE: ICD-10-CM

## 2022-07-28 PROCEDURE — 97112 NEUROMUSCULAR REEDUCATION: CPT

## 2022-07-28 PROCEDURE — 97110 THERAPEUTIC EXERCISES: CPT

## 2022-07-28 NOTE — PROGRESS NOTES
Daily Note     Today's date: 2022  Patient name: Dolly Napier  : 1965  MRN: [id number]  Referring provider: Kishore Jones, PT  Dx:   Encounter Diagnosis     ICD-10-CM    1  Right sided weakness  R53 1    2  History of stroke  Z86 73                   Subjective: Patient reports no new changes at this time      Objective: See treatment diary below      Assessment:  Good endurance noted t/o session with good tolerance to added HS curl on machine & weight increases to (R) LE  Patient would benefit from continued PT  Plan: Continue per plan of care        Manuals 22   Right Shoulder and wrist PROM and stretch                                Neuro Re-Ed         MTP/LTP        Side-Stepping 3x30 3x30 braiding 3x30' braiding 3x30' braiding 2# on (L) 3# on (R)  3x30"    Tandem walk  4x8 On floor 4x8'   Fwd hi-stepping/ retro On floor 4x8' On floor 4x8' 4x8'   Stance on foam 3x30 3x30 3x30"  3x30'   Stance with EC 3x30 3x30  3x30"  3x30"        grapevine 3x8 3x8'  3x8' 3x8' 3x8'   Supine bic  kicks 2x10 2x10 2x10 2x10 2x10   AROM Flex (R) supine 3x10 2x10 2x10 3x10 3x10   bridges 2x10 2x10 2x10 2x10 2x10   Cane flexion 2x10 2x10 2x10 2x10 2x10   Step overs (roll) lat, front/back 2x10 R 3#  2x10 L 4# NV 2x10 varying heights 2x10 varying heights 2x10 R 2#   3# 2x10 L   BOSU step overs with balance hold   10x with 5" hold                     Ther Ex        Nustep L2  10  L6   10 min L2 10' L2 10' 10 min L2    lunges R 3#, L 4# 2x10 R 3# L 3# 2x10  R 2#, L 3# 2x10 R 2# L 3# 2x10   TR/HR 20 20x 20x R 2#, L 3# 2x10  2x10   Standing SLR 3-way 2x10 R 3#/L4# R 3# / L3# 2x10   R 2#, L 3# 2x10 ea R 2# / L3# 2x10    LAQ 2x10 R 3#/L4# R 3#/ L3# 2x10   3" 2x10 R2#/L3# 2x10   Seated Marches 2x10 R3#/L4# R3# / L3#   2x10   Standing   R 2#, L 3# 2x10 R 2#/ L 3#   2x10   Seated HS curls with TB 2x10 BTB BTB 2x10  BTB R 2#, L 3# 2x10 BTB 2x10    Sup/Pron Roller    2# PRON/ SUP 2# 2x10   Bicep Curls 2#  2x10  2#  2x10 2# 2x10 + FLEX 2# 2x10 2# cuff wgt 2x10    Right Side Shrugs with TB BTB 2x10 BTB 2x10 RTB 2x10 BTB 2x10 btb 2x10   Seated hip abd GTB 2x10 BTB 2x10 GTB 2x10 BTB 2x10 BTB 2x10    squat 2x10 2x10  2x10 R 2#, L 3# 2x10 2x10   pulleys 5 min  5 min  5 min 5min 5 min    UBE 5 min NV 5 min 5 min 5 min   Cat Cow 2x10 w/ hand OP 2x10 w/ hand OP  2x10 w/ hand OP 2x10 w hand OP   Bike  10 min      HS curl Machine  #50, x25              Ther Activity        Steps                Gait Training                        Modalities

## 2022-08-02 ENCOUNTER — OFFICE VISIT (OUTPATIENT)
Dept: PHYSICAL THERAPY | Facility: CLINIC | Age: 57
End: 2022-08-02
Payer: COMMERCIAL

## 2022-08-02 DIAGNOSIS — R53.1 RIGHT SIDED WEAKNESS: Primary | ICD-10-CM

## 2022-08-02 PROCEDURE — 97110 THERAPEUTIC EXERCISES: CPT

## 2022-08-02 PROCEDURE — 97112 NEUROMUSCULAR REEDUCATION: CPT

## 2022-08-02 NOTE — PROGRESS NOTES
Daily Note     Today's date: 2022  Patient name: Pee Nash  : 1965  MRN: [id number]  Referring provider: Delicia Sheriff, PT  Dx:   Encounter Diagnosis     ICD-10-CM    1  Right sided weakness  R53 1                   Subjective: Patient reports no new complaints today  Objective: See treatment diary below      Assessment: Tolerated treatment well  Patient exhibited good technique with therapeutic exercises      Plan: Continue per plan of care        Manuals 22   Right Shoulder and wrist PROM and stretch                                Neuro Re-Ed         MTP/LTP        Side-Stepping 3x30 3x30 braiding 3x30' braiding 3x30' braiding 2# on (L) 3# on (R)  3x30"    Tandem walk  4x8 On floor 4x8'   Fwd hi-stepping/ retro On floor 4x8' On floor 4x8' 4x8'   Stance on foam 3x30 3x30 3x30"  3x30'   Stance with EC 3x30 3x30  3x30"  3x30"        grapevine 3x8 3x8'  3x8' 3x8' 3x8'   Supine bic  kicks 2x10 2x10 2x10 2x10 2x10   AROM Flex (R) supine 3x10 2x10 2x10 3x10 3x10   bridges 2x10 2x10 2x10 2x10 2x10   Cane flexion 2x10 2x10 2x10 2x10 2x10   Step overs (roll) lat, front/back 2x10 R 3#  2x10 L 4# NV  2x10 varying heights 2x10 R 2#   3# 2x10 L   BOSU step overs with balance hold                        Ther Ex        Nustep L2  10  L6   10 min L2 10' L2 10' 10 min L2    lunges R 3#, L 4# 2x10 R 3# L 3# 2x10  R 2#, L 3# 2x10 R 2# L 3# 2x10   TR/HR 20 20x 20x R 2#, L 3# 2x10  2x10   Standing SLR 3-way 2x10 R 3#/L4# R 3# / L3# 2x10  R3# L2# 2x10 R 2#, L 3# 2x10 ea R 2# / L3# 2x10    LAQ 2x10 R 3#/L4# R 3#/ L3# 2x10  R3# L2# 2x10 3" 2x10 R2#/L3# 2x10   Seated Marches 2x10 R3#/L4# R3# / L3#   2x10  R3# L3# 2x10 Standing   R 2#, L 3# 2x10 R 2#/ L 3#   2x10   Seated HS curls with TB 2x10 BTB BTB 2x10  BTB R 2#, L 3# 2x10 BTB 2x10    Sup/Pron Roller    2# PRON/ SUP 2# 2x10   Bicep Curls 2#  2x10  2#  2x10 2# 2x10 + FLEX 2# 2x10 2# cuff wgt 2x10    Right Side Shrugs with TB BTB 2x10 BTB 2x10 RTB 2x10 BTB 2x10 btb 2x10   Seated hip abd GTB 2x10 BTB 2x10 GTB 2x10 BTB 2x10 BTB 2x10    squat 2x10 2x10  2x10 R 2#, L 3# 2x10 2x10   pulleys 5 min  5 min  5 min 5min 5 min    UBE 5 min NV 5 min 5 min 5 min   Cat Cow 2x10 w/ hand OP 2x10 w/ hand OP 2x10  2x10 w/ hand OP 2x10 w hand OP   Bike  10 min 10 min     HS curl Machine  #50, x25 50# 25x             Ther Activity        Steps                Gait Training                        Modalities

## 2022-08-04 ENCOUNTER — APPOINTMENT (OUTPATIENT)
Dept: PHYSICAL THERAPY | Facility: CLINIC | Age: 57
End: 2022-08-04
Payer: COMMERCIAL

## 2022-08-05 ENCOUNTER — OFFICE VISIT (OUTPATIENT)
Dept: PHYSICAL THERAPY | Facility: CLINIC | Age: 57
End: 2022-08-05
Payer: COMMERCIAL

## 2022-08-05 DIAGNOSIS — R53.1 RIGHT SIDED WEAKNESS: Primary | ICD-10-CM

## 2022-08-05 PROCEDURE — 97112 NEUROMUSCULAR REEDUCATION: CPT

## 2022-08-05 PROCEDURE — 97110 THERAPEUTIC EXERCISES: CPT

## 2022-08-05 NOTE — PROGRESS NOTES
Daily Note     Today's date: 2022  Patient name: Ho Rogers  : 1965  MRN: [id number]  Referring provider: Jimmie Nelson PT  Dx:   Encounter Diagnosis     ICD-10-CM    1  Right sided weakness  R53 1                   Subjective: Patient reports no new complaints today  Objective: See treatment diary below      Assessment: Tolerated treatment well  Patient exhibited good technique with therapeutic exercises      Plan: Continue per plan of care        Manuals 22   Right Shoulder and wrist PROM and stretch                                Neuro Re-Ed         MTP/LTP        Side-Stepping 3x30 3x30 braiding 3x30' braiding 3x30' braiding 2# on (L) 3# on (R)  3x30"    Tandem walk  4x8 On floor 4x8'   Fwd hi-stepping/ retro On floor 4x8' On floor 4x8' 4x8'   Stance on foam 3x30 3x30 3x30"  3x30'   Stance with EC 3x30 3x30  3x30"  3x30"        grapevine 3x8 3x8'  3x8' 3x8' 3x8'   Supine bic  kicks 2x10 2x10 2x10 2x10 2x10   AROM Flex (R) supine 3x10 2x10 2x10 3x10 3x10   bridges 2x10 2x10 2x10 2x10 2x10   Cane flexion 2x10 2x10 2x10 2x10 2x10   Step overs (roll) lat, front/back 2x10 R 3#  2x10 L 4# NV   2x10 R 2#   3# 2x10 L   BOSU step overs with balance hold                        Ther Ex        Nustep L2  10  L6   10 min L2 10' L2 10' 10 min L2    lunges R 3#, L 4# 2x10 R 3# L 3# 2x10  R 2#, L 3# 2x10 R 2# L 3# 2x10   TR/HR 20 20x 20x R 2#, L 3# 2x10  2x10   Standing SLR 3-way 2x10 R 3#/L4# R 3# / L3# 2x10  R3# L2# 2x10 R 2#, L 3# 2x10 ea R 2# / L3# 2x10    LAQ 2x10 R 3#/L4# R 3#/ L3# 2x10  R3# L2# 2x10 3" 2x10 R2#/L3# 2x10   Seated Marches 2x10 R3#/L4# R3# / L3#   2x10  R3# L3# 2x10 Standing   R 2#, L 3# 2x10 R 2#/ L 3#   2x10   Seated HS curls with TB 2x10 BTB BTB 2x10  BTB R 2#, L 3# 2x10 BTB 2x10    Sup/Pron Roller     2# 2x10   Bicep Curls 2#  2x10  2#  2x10 2# 2x10 3# 2x10 2# cuff wgt 2x10    Right Side Shrugs with TB BTB 2x10 BTB 2x10 RTB 2x10 BTB 2x10 btb 2x10 Seated hip abd GTB 2x10 BTB 2x10 GTB 2x10 BTB 2x10 BTB 2x10    squat 2x10 2x10  2x10 R 2#, L 3# 2x10 2x10   pulleys 5 min  5 min  5 min 5min 5 min    UBE 5 min NV 5 min 5 min 5 min   Cat Cow 2x10 w/ hand OP 2x10 w/ hand OP 2x10   2x10 w hand OP   Bike  10 min 10 min 10 min    HS curl Machine  #50, x25 50# 25x 50# 25x            Ther Activity        Steps                Gait Training                        Modalities

## 2022-08-08 ENCOUNTER — OFFICE VISIT (OUTPATIENT)
Dept: PHYSICAL THERAPY | Facility: CLINIC | Age: 57
End: 2022-08-08
Payer: COMMERCIAL

## 2022-08-08 DIAGNOSIS — R53.1 RIGHT SIDED WEAKNESS: Primary | ICD-10-CM

## 2022-08-08 PROCEDURE — 97110 THERAPEUTIC EXERCISES: CPT

## 2022-08-08 PROCEDURE — 97112 NEUROMUSCULAR REEDUCATION: CPT

## 2022-08-08 NOTE — PROGRESS NOTES
Daily Note     Today's date: 2022  Patient name: Luis Cui  : 1965  MRN: [id number]  Referring provider: Man Iqbal PT  Dx:   Encounter Diagnosis     ICD-10-CM    1  Right sided weakness  R53 1                   Subjective: "I'm doing okay today "      Objective: See treatment diary below      Assessment: Tolerated treatment well  Patient exhibited good technique with therapeutic exercises      Plan: Continue per plan of care        Manuals 22   Right Shoulder and wrist PROM and stretch                                Neuro Re-Ed         MTP/LTP        Side-Stepping 3x30 3x30 braiding 3x30' braiding 3x30' braiding 2# on (L) 3# on (R)  3x30"    Tandem walk  4x8 On floor 4x8'   Fwd hi-stepping/ retro On floor 4x8' On floor 4x8' 4x8'   Stance on foam 3x30 3x30 3x30"  3x30'   Stance with EC 3x30 3x30  3x30"  3x30"        grapevine 3x8 3x8'  3x8' 3x8' 3x8'   Supine bic  kicks 2x10 2x10 2x10 2x10 2x10   AROM Flex (R) supine 3x10 2x10 2x10 3x10    bridges 2x10 2x10 2x10 2x10 2x10   Cane flexion 2x10 2x10 2x10 2x10    Step overs (roll) lat, front/back 2x10 R 3#  2x10 L 4# NV      BOSU step overs with balance hold                        Ther Ex        Nustep L2  10  L6   10 min L2 10' L2 10' 10 min L2    lunges R 3#, L 4# 2x10 R 3# L 3# 2x10  R 2#, L 3# 2x10    TR/HR 20 20x 20x R 2#, L 3# 2x10  2x10   Standing SLR 3-way 2x10 R 3#/L4# R 3# / L3# 2x10  R3# L2# 2x10 R 2#, L 3# 2x10 ea R 2# / L3# 2x10    LAQ 2x10 R 3#/L4# R 3#/ L3# 2x10  R3# L2# 2x10 3" 2x10 R2#/L3# 2x10   Seated Marches 2x10 R3#/L4# R3# / L3#   2x10  R3# L3# 2x10 Standing   R 2#, L 3# 2x10 R 2#/ L 3#   2x10   Seated HS curls with TB 2x10 BTB BTB 2x10  BTB R 2#, L 3# 2x10    Sup/Pron Roller        Bicep Curls 2#  2x10  2#  2x10 2# 2x10 3# 2x10    Right Side Shrugs with TB BTB 2x10 BTB 2x10 RTB 2x10 BTB 2x10    Seated hip abd GTB 2x10 BTB 2x10 GTB 2x10 BTB 2x10    squat 2x10 2x10  2x10 R 2#, L 3# 2x10 2x10 pulleys 5 min  5 min  5 min 5min 5 min    UBE 5 min NV 5 min 5 min 5 min   Cat Cow 2x10 w/ hand OP 2x10 w/ hand OP 2x10      Bike  10 min 10 min 10 min 10 min   HS curl Machine  #50, x25 50# 25x 50# 25x 50# 25x           Ther Activity        Steps                Gait Training                        Modalities

## 2022-08-09 ENCOUNTER — APPOINTMENT (OUTPATIENT)
Dept: PHYSICAL THERAPY | Facility: CLINIC | Age: 57
End: 2022-08-09
Payer: COMMERCIAL

## 2022-08-11 ENCOUNTER — APPOINTMENT (OUTPATIENT)
Dept: PHYSICAL THERAPY | Facility: CLINIC | Age: 57
End: 2022-08-11
Payer: COMMERCIAL

## 2022-08-16 ENCOUNTER — OFFICE VISIT (OUTPATIENT)
Dept: PHYSICAL THERAPY | Facility: CLINIC | Age: 57
End: 2022-08-16
Payer: COMMERCIAL

## 2022-08-16 DIAGNOSIS — R53.1 RIGHT SIDED WEAKNESS: Primary | ICD-10-CM

## 2022-08-16 PROCEDURE — 97110 THERAPEUTIC EXERCISES: CPT

## 2022-08-16 PROCEDURE — 97112 NEUROMUSCULAR REEDUCATION: CPT

## 2022-08-16 NOTE — PROGRESS NOTES
Daily Note     Today's date: 2022  Patient name: Garrison Shaw  : 1965  MRN: [id number]  Referring provider: Gena Saucedo PT  Dx: No diagnosis found  Subjective:   "Im good"      Objective: See treatment diary below      Assessment: Tolerated treatment well  Patient exhibited good technique with therapeutic exercises      Plan: Continue per plan of care        Manuals 2022   Right Shoulder and wrist PROM and stretch                                Neuro Re-Ed         MTP/LTP        Side-Stepping 3x30 3x30 braiding 3x30' braiding 3x30' braiding 2# on (L) 3# on (R)  3x30"    Tandem walk  4x8 On floor 4x8'   Fwd hi-stepping/ retro On floor 4x8' On floor 4x8' 4x8'   Stance on foam 3x30 3x30 3x30"  3x30'   Stance with EC 3x30 3x30  3x30"  3x30"        grapevine 3x8 3x8'  3x8' 3x8' 3x8'   Supine bic  kicks 2x10 2x10 2x10 2x10 2x10   AROM Flex (R) supine 3x10 2x10 2x10 3x10    bridges 2x10 2x10 2x10 2x10 2x10   Cane flexion 2x10 2x10 2x10 2x10    Step overs (roll) lat, front/back  NV      BOSU step overs with balance hold                        Ther Ex        Nustep  L6   10 min L2 10' L2 10' 10 min L2    lunges R 3#, L 4# 2x10 R 3# L 3# 2x10  R 2#, L 3# 2x10    TR/HR 20 20x 20x R 2#, L 3# 2x10  2x10   Standing SLR 3-way 2x10 R 3#/L4# R 3# / L3# 2x10  R3# L2# 2x10 R 2#, L 3# 2x10 ea R 2# / L3# 2x10    LAQ 2x10 R 3#/L4# R 3#/ L3# 2x10  R3# L2# 2x10 3" 2x10 R2#/L3# 2x10   Seated Marches 2x10 R3#/L4# R3# / L3#   2x10  R3# L3# 2x10 Standing   R 2#, L 3# 2x10 R 2#/ L 3#   2x10   Seated HS curls with TB 2x10 BTB BTB 2x10  BTB R 2#, L 3# 2x10    Sup/Pron Roller        Bicep Curls 2#  2x10  2#  2x10 2# 2x10 3# 2x10    Right Side Shrugs with TB BTB 2x10 BTB 2x10 RTB 2x10 BTB 2x10    Seated hip abd GTB 2x10 BTB 2x10 GTB 2x10 BTB 2x10    squat 2x10 2x10  2x10 R 2#, L 3# 2x10 2x10   pulleys 5 min  5 min  5 min 5min 5 min    UBE 5 min NV 5 min 5 min 5 min   Cat Cow 2x10 w/ hand OP 2x10 w/ hand OP 2x10      Bike 10' 10 min 10 min 10 min 10 min   HS curl Machine 50#  25x #50, x25 50# 25x 50# 25x 50# 25x   TM     04 mph  5'       Ther Activity        Steps                Gait Training                        Modalities

## 2022-08-18 ENCOUNTER — APPOINTMENT (OUTPATIENT)
Dept: PHYSICAL THERAPY | Facility: CLINIC | Age: 57
End: 2022-08-18
Payer: COMMERCIAL

## 2022-08-19 ENCOUNTER — OFFICE VISIT (OUTPATIENT)
Dept: PHYSICAL THERAPY | Facility: CLINIC | Age: 57
End: 2022-08-19
Payer: COMMERCIAL

## 2022-08-19 DIAGNOSIS — Z86.73 HISTORY OF STROKE: ICD-10-CM

## 2022-08-19 DIAGNOSIS — R26.9 ABNORMAL GAIT: ICD-10-CM

## 2022-08-19 DIAGNOSIS — R53.1 RIGHT SIDED WEAKNESS: Primary | ICD-10-CM

## 2022-08-19 PROCEDURE — 97110 THERAPEUTIC EXERCISES: CPT

## 2022-08-19 PROCEDURE — 97112 NEUROMUSCULAR REEDUCATION: CPT

## 2022-08-19 NOTE — PROGRESS NOTES
Daily Note     Today's date: 2022  Patient name: Gómez Ku  : 1965  MRN: [id number]  Referring provider: Lucy Amador PT  Dx:   Encounter Diagnosis     ICD-10-CM    1  Right sided weakness  R53 1    2  History of stroke  Z86 73    3  Abnormal gait  R26 9        Start Time: 0800  Stop Time: 1264  Total time in clinic (min): 50 minutes    Subjective: Patient offered no complaints at this time  Objective: See treatment diary below      Assessment: Patient tolerated treatment well  She completed full program without pain or discomfort  Some difficulty noted with seated marches  Plan: Continue per plan of care        Manuals 2022   Right Shoulder and wrist PROM and stretch                                Neuro Re-Ed         MTP/LTP        Side-Stepping 3x30 3x30  3x30' braiding 3x30' braiding 2# on (L) 3# on (R)  3x30"    Tandem walk  4x8 4x8'  On floor 4x8' On floor 4x8' 4x8'   Stance on foam 3x30 3x30 3x30"  3x30'   Stance with EC 3x30 3x30  3x30"  3x30"        grapevine 3x8 3x8'  3x8' 3x8' 3x8'   Supine bic  kicks 2x10 2x10 2x10 2x10 2x10   AROM Flex (R) supine 3x10 2x10 2x10 3x10    bridges 2x10 2x10 2x10 2x10 2x10   Cane flexion 2x10 2x10 2x10 2x10    Step overs (roll) lat, front/back        BOSU step overs with balance hold                        Ther Ex        Nustep  L6   10 min L2 10' L2 10' 10 min L2    lunges R 3#, L 4# 2x10 R 3# L 3# 2x10  R 2#, L 3# 2x10    TR/HR 20 20x 20x R 2#, L 3# 2x10  2x10   Standing SLR 3-way 2x10 R 3#/L4# R 3# / L3# 2x10  R3# L2# 2x10 R 2#, L 3# 2x10 ea R 2# / L3# 2x10    LAQ 2x10 R 3#/L4# R 3#/ L3# 2x10  R3# L2# 2x10 3" 2x10 R2#/L3# 2x10   Seated Marches 2x10 R3#/L4# R3# / L3#   2x10  R3# L3# 2x10 Standing   R 2#, L 3# 2x10 R 2#/ L 3#   2x10   Seated HS curls with TB 2x10 BTB BTB 2x10  BTB R 2#, L 3# 2x10    Sup/Pron Roller        Bicep Curls 2#  2x10  2#  2x10 2# 2x10 3# 2x10    Right Side Shrugs with TB BTB 2x10 BTB 2x10 RTB 2x10 BTB 2x10    Seated hip abd GTB 2x10 BTB 2x10 GTB 2x10 BTB 2x10    squat 2x10 2x10  2x10 R 2#, L 3# 2x10 2x10   pulleys 5 min  5 min  5 min 5min 5 min    UBE 5 min  5 min 5 min 5 min   Cat Cow 2x10 w/ hand OP 2x10 w/ hand OP 2x10      Bike 10' 10 min 10 min 10 min 10 min   HS curl Machine 50#  25x #50, x25 50# 25x 50# 25x 50# 25x   TM     04 mph  5'       Ther Activity        Steps                Gait Training                        Modalities

## 2022-08-23 ENCOUNTER — OFFICE VISIT (OUTPATIENT)
Dept: PHYSICAL THERAPY | Facility: CLINIC | Age: 57
End: 2022-08-23
Payer: COMMERCIAL

## 2022-08-23 DIAGNOSIS — R53.1 RIGHT SIDED WEAKNESS: Primary | ICD-10-CM

## 2022-08-23 PROCEDURE — 97110 THERAPEUTIC EXERCISES: CPT

## 2022-08-23 PROCEDURE — 97112 NEUROMUSCULAR REEDUCATION: CPT

## 2022-08-23 NOTE — PROGRESS NOTES
Daily Note     Today's date: 2022  Patient name: Merle Nguyen  : 1965  MRN: [id number]  Referring provider: Marci Gonsales, CHARLIE  Dx:   Encounter Diagnosis     ICD-10-CM    1  Right sided weakness  R53 1                   Subjective:   "Im ok"        Objective: See treatment diary below      Assessment: Tolerated treatment well  Patient exhibited good technique with therapeutic exercises      Plan: Continue per plan of care        Manuals 2022   Right Shoulder and wrist PROM and stretch                                Neuro Re-Ed         MTP/LTP        Side-Stepping 3x30 3x30  3x30' braiding 3x30' braiding 2# on (L) 3# on (R)  3x30"    Tandem walk  4x8 4x8'  4x8' On floor 4x8' 4x8'   Stance on foam 3x30 3x30 3x30"  3x30'   Stance with EC 3x30 3x30  3x30"  3x30"        grapevine 3x8 3x8'  3x8' 3x8' 3x8'   Supine bic  kicks 2x10 2x10 2x10 2x10 2x10   AROM Flex (R) supine 3x10 2x10 2x10 3x10    bridges 2x10 2x10 2x10 2x10 2x10   Cane flexion 2x10 2x10 2x10 2x10    Step overs (roll) lat, front/back        BOSU step overs with balance hold                        Ther Ex        Nustep  L6   10 min L2 10' L2 10' 10 min L2    lunges R 3#, L 4# 2x10 R 3# L 3# 2x10 R 3#   L  4#  20 R 2#, L 3# 2x10    TR/HR 20 20x 20x R 2#, L 3# 2x10  2x10   Standing SLR 3-way 2x10 R 3#/L4# R 3# / L3# 2x10  R3# L2# 2x10 R 2#, L 3# 2x10 ea R 2# / L3# 2x10    LAQ 2x10 R 3#/L4# R 3#/ L3# 2x10  R3# L2# 2x10 3" 2x10 R2#/L3# 2x10   Seated Marches 2x10 R3#/L4# R3# / L3#   2x10  R3# L3# 2x10 Standing   R 2#, L 3# 2x10 R 2#/ L 3#   2x10   Seated HS curls with TB 2x10 BTB BTB 2x10 BTB  2x20 BTB R 2#, L 3# 2x10    Sup/Pron Roller        Bicep Curls 2#  2x10  2#  2x10 2# 2x10 3# 2x10    Right Side Shrugs with TB BTB 2x10 BTB 2x10 RTB 2x10 BTB 2x10    Seated hip abd GTB 2x10 BTB 2x10 GTB 2x10 BTB 2x10    squat 2x10 2x10  2x10 R 2#, L 3# 2x10 2x10   pulleys 5 min  5 min  5 min 5min 5 min    UBE 5 min  5 min 5 min 5 min   Cat Cow 2x10 w/ hand OP 2x10 w/ hand OP 2x10      Bike 10' 10 min 10 min 10 min 10 min   HS curl Machine 50#  25x #50, x25 50# 25x 50# 25x 50# 25x   TM     04 mph  5'       Ther Activity        Steps                Gait Training                        Modalities

## 2022-08-23 NOTE — LETTER
2022    Mary Lou Mattson MD  [address]  [address]  [facility] [address]    Patient: Esvin Carbajal   YOB: 1965   Date of Visit: 2022     Encounter Diagnosis     ICD-10-CM    1  Right sided weakness  R53 1        Dear Dr Maggie Ortiz: Thank you for your recent referral of Esvin Carbajal  Please review the attached evaluation summary from Christian's recent visit  Please verify that you agree with the plan of care by signing the attached order  If you have any questions or concerns, please do not hesitate to call  I sincerely appreciate the opportunity to share in the care of one of your patients and hope to have another opportunity to work with you in the near future  Sincerely,    Susan Conner PT      Referring Provider:      I certify that I have read the below Plan of Care and certify the need for these services furnished under this plan of treatment while under my care  Mary Lou Mattson MD  [address]  [id number] [place] [address]  Via Fax: [phone]          Daily Note     Today's date: 2022  Patient name: Esvin Carbajal  : 1965  MRN: [id number]  Referring provider: Mely Everett PT  Dx:   Encounter Diagnosis     ICD-10-CM    1  Right sided weakness  R53 1                   Subjective:   "Im ok"        Objective: See treatment diary below      Assessment: Tolerated treatment well  Patient exhibited good technique with therapeutic exercises      Plan: Continue per plan of care        Manuals 2022   Right Shoulder and wrist PROM and stretch                                Neuro Re-Ed         MTP/LTP        Side-Stepping 3x30 3x30  3x30' braiding 3x30' braiding 2# on (L) 3# on (R)  3x30"    Tandem walk  4x8 4x8'  4x8' On floor 4x8' 4x8'   Stance on foam 3x30 3x30 3x30"  3x30'   Stance with EC 3x30 3x30  3x30"  3x30"        grapevine 3x8 3x8'  3x8' 3x8' 3x8'   Supine bic  kicks 2x10 2x10 2x10 2x10 2x10   AROM Flex (R) supine 3x10 2x10 2x10 3x10    bridges 2x10 2x10 2x10 2x10 2x10   Cane flexion 2x10 2x10 2x10 2x10    Step overs (roll) lat, front/back        BOSU step overs with balance hold                        Ther Ex        Nustep  L6   10 min L2 10' L2 10' 10 min L2    lunges R 3#, L 4# 2x10 R 3# L 3# 2x10 R 3#   L  4#  20 R 2#, L 3# 2x10    TR/HR 20 20x 20x R 2#, L 3# 2x10  2x10   Standing SLR 3-way 2x10 R 3#/L4# R 3# / L3# 2x10  R3# L2# 2x10 R 2#, L 3# 2x10 ea R 2# / L3# 2x10    LAQ 2x10 R 3#/L4# R 3#/ L3# 2x10  R3# L2# 2x10 3" 2x10 R2#/L3# 2x10   Seated Marches 2x10 R3#/L4# R3# / L3#   2x10  R3# L3# 2x10 Standing   R 2#, L 3# 2x10 R 2#/ L 3#   2x10   Seated HS curls with TB 2x10 BTB BTB 2x10 BTB  2x20 BTB R 2#, L 3# 2x10    Sup/Pron Roller        Bicep Curls 2#  2x10  2#  2x10 2# 2x10 3# 2x10    Right Side Shrugs with TB BTB 2x10 BTB 2x10 RTB 2x10 BTB 2x10    Seated hip abd GTB 2x10 BTB 2x10 GTB 2x10 BTB 2x10    squat 2x10 2x10  2x10 R 2#, L 3# 2x10 2x10   pulleys 5 min  5 min  5 min 5min 5 min    UBE 5 min  5 min 5 min 5 min   Cat Cow 2x10 w/ hand OP 2x10 w/ hand OP 2x10      Bike 10' 10 min 10 min 10 min 10 min   HS curl Machine 50#  25x #50, x25 50# 25x 50# 25x 50# 25x   TM     04 mph  5'       Ther Activity        Steps                Gait Training                        Modalities                                                                                      Attestation signed by García Villar PT at 2022  8:54 AM:  I supervised the visit  We discussed the case to ensure appropriate continuation and progression of care and I reviewed the documentation  PT Re-Evaluation     Today's date: 2022  Patient name: Lizzeth Schreiber  : 1965  MRN: [id number]  Referring provider: Sharon Baker PT  Dx:   Encounter Diagnosis     ICD-10-CM    1   Right sided weakness  R53 1        Start Time: 0800  Stop Time: 88  Total time in clinic (min): 55 minutes    Assessment  Assessment details: Patient continues to make progress with PT POC  Patient is showing improved strength t/o B/L upper and lower extremity  Patient's endurance and activity tolerance continue to improve with exercise program  Patient would benefit from continued PT intervention x 4 weeks then plan re-assessment for possible d/c to HEP/wellness program    Impairments: abnormal gait, abnormal muscle tone, abnormal or restricted ROM, activity intolerance, impaired balance, impaired physical strength, lacks appropriate home exercise program, safety issue, weight-bearing intolerance and poor posture     Goals  ST  Initiate HEP- Met   2  Patient to increase right UE and LE strength to 4-/5 t/o in 4 weeks- Progressing    LT  Patient to increase right UE and LE strength to 4+/5 in 8 weeks- Progressing  2  Patient to improve ambulation and balance to [facility] in 8 weeks- Progressing  3   Patient to increase endurance to [facility] in 8 weeks- Progressing    Plan  Patient would benefit from: skilled physical therapy  Planned modality interventions: cryotherapy and thermotherapy: hydrocollator packs  Other planned modality interventions: Modalities PRN  Planned therapy interventions: IADL retraining, ADL retraining, manual therapy, balance, balance/weight bearing training, neuromuscular re-education, patient education, postural training, strengthening, stretching, therapeutic activities, therapeutic exercise, therapeutic training, transfer training, gait training, functional ROM exercises, flexibility, graded activity, graded exercise and home exercise program  Frequency: 2x week  Duration in visits: 8  Duration in weeks: 4  Treatment plan discussed with: patient        Subjective Evaluation    History of Present Illness  Date of onset: 2021  Pain  Aggravating factors: overhead activity, lifting and walking  Progression: improved    Social Support  Stairs in house: yes   15    Patient Goals  Patient goal: To work on my arm and get some strength back in my legs        Objective     Static Posture     Shoulders  Depressed  Neurological Testing     Sensation     Shoulder   Left Shoulder   Intact: light touch    Right Shoulder   Intact: light touch    Wrist/Hand   Left   Intact: light touch    Right   Diminished: light touch  Paresthesia: light touch    Active Range of Motion   Left Shoulder   Normal active range of motion    Right Shoulder   Flexion: 60 degrees   Abduction: 90 degrees     Left Elbow   Normal active range of motion    Right Elbow   Flexion: WFL  Extension: WFL    Additional Active Range of Motion Details  Right wrist PROM limited 50%  Patient with limited finger extension, she states her hand and fingers will continuously flex up on her    Passive Range of Motion   Left Shoulder   Normal passive range of motion    Right Shoulder   Flexion: 90 degrees   Abduction: 100 degrees     Right Wrist   Normal passive range of motion    Additional Passive Range of Motion Details  Full PROM in right hand    Strength/Myotome Testing     Right Shoulder     Planes of Motion   Flexion: 3-   Abduction: 3-   External rotation at 0°: 3-   Internal rotation at 0°: 3-     Right Elbow   Flexion: 3  Extension: 3  Forearm supination: 3  Forearm pronation: 3    Left Wrist/Hand   Normal wrist strength    Right Wrist/Hand   Wrist extension: 3-  Wrist flexion: 3-  Radial deviation: 3-  Ulnar deviation: 3-    Ambulation     Ambulation: Level Surfaces   Ambulation without assistive device: independent    Observational Gait   Gait: asymmetric   Decreased walking speed, stride length, right stance time and right step length     Right foot contact pattern: foot flat    Additional Observational Gait Details  Patient using AFO brace for foot drop on right

## 2022-08-25 ENCOUNTER — APPOINTMENT (OUTPATIENT)
Dept: PHYSICAL THERAPY | Facility: CLINIC | Age: 57
End: 2022-08-25
Payer: COMMERCIAL

## 2022-08-26 ENCOUNTER — OFFICE VISIT (OUTPATIENT)
Dept: PHYSICAL THERAPY | Facility: CLINIC | Age: 57
End: 2022-08-26
Payer: COMMERCIAL

## 2022-08-26 DIAGNOSIS — R26.9 ABNORMAL GAIT: ICD-10-CM

## 2022-08-26 DIAGNOSIS — R53.1 RIGHT SIDED WEAKNESS: Primary | ICD-10-CM

## 2022-08-26 DIAGNOSIS — Z86.73 HISTORY OF STROKE: ICD-10-CM

## 2022-08-26 PROCEDURE — 97112 NEUROMUSCULAR REEDUCATION: CPT

## 2022-08-26 PROCEDURE — 97110 THERAPEUTIC EXERCISES: CPT

## 2022-08-26 NOTE — PROGRESS NOTES
Daily Note     Today's date: 2022  Patient name: Nimesh Rodriguez  : 1965  MRN: [id number]  Referring provider: Armani Boes PT  Dx:   Encounter Diagnosis     ICD-10-CM    1  Right sided weakness  R53 1    2  History of stroke  Z86 73    3  Abnormal gait  R26 9        Start Time: 0800  Stop Time: 0900  Total time in clinic (min): 60 minutes    Subjective: Patient continues to do well with therapy program  She offered no complaints at this time  Objective: See treatment diary below      Assessment: Patient responded well to program today, including resistance with standing TE  She demonstrated good coordination with standing balacne drills and without LOB  Minimal to no fatigue noted post        Plan: Continue per plan of care        Manuals 2022   Right Shoulder and wrist PROM and stretch                                Neuro Re-Ed         MTP/LTP        Side-Stepping 3x30 3x30  3x30' braiding 3x30' braiding  3x30"    Tandem walk  4x8 4x8'  4x8' On floor 4x8' 4x8'   Stance on foam 3x30 3x30 3x30" 3x30" 3x30'   Stance with EC 3x30 3x30  3x30" 3x30"  3x30"        grapevine 3x8 3x8'  3x8' 3x8' 3x8'   Supine bic  kicks 2x10 2x10 2x10 2x10 2x10   AROM Flex (R) supine 3x10 2x10 2x10 2x10    bridges 2x10 2x10 2x10 2x10 2x10   Cane flexion 2x10 2x10 2x10 2x10    Step overs (roll) lat, front/back        BOSU step overs with balance hold                        Ther Ex        Nustep  L6   10 min L2 10'  10 min L2    lunges R 3#, L 4# 2x10 R 3# L 3# 2x10 R 3#   L  4#  20 R 2#, L 3# 2x10    TR/HR 20 20x 20x R 2#, L 3# 2x10  2x10   Standing SLR 3-way 2x10 R 3#/L4# R 3# / L3# 2x10  R3# L2# 2x10 R 3#, L 4# 2x10 ea R 2# / L3# 2x10    LAQ 2x10 R 3#/L4# R 3#/ L3# 2x10  R3# L2# 2x10 3" 2x10 R3#/L4# R2#/L3# 2x10   Seated Marches 2x10 R3#/L4# R3# / L3#   2x10  R3# L3# 2x10 R 3#, L 4# 2x10 R 2#/ L 3#   2x10   Seated HS curls with TB 2x10 BTB BTB 2x10 BTB  2x20 BTB 2x20     Sup/Pron Roller Bicep Curls 2#  2x10  2#  2x10 2# 2x10 3# 2x10    Right Side Shrugs with TB BTB 2x10 BTB 2x10 RTB 2x10 BTB 2x10    Seated hip abd GTB 2x10 BTB 2x10 GTB 2x10 BTB 2x10    squat 2x10 2x10  2x10 R 3#, L 4# 2x10 2x10   pulleys 5 min  5 min  5 min 5min 5 min    UBE 5 min  5 min 5 min 5 min   Cat Cow 2x10 w/ hand OP 2x10 w/ hand OP 2x10  2x10    Bike 10' 10 min 10 min 10 min 10 min   HS curl Machine 50#  25x #50, x25 50# 25x 50# 25x 50# 25x   TM     04 mph  5'       Ther Activity        Steps                Gait Training                        Modalities

## 2022-08-30 ENCOUNTER — OFFICE VISIT (OUTPATIENT)
Dept: PHYSICAL THERAPY | Facility: CLINIC | Age: 57
End: 2022-08-30
Payer: COMMERCIAL

## 2022-08-30 DIAGNOSIS — R53.1 RIGHT SIDED WEAKNESS: Primary | ICD-10-CM

## 2022-08-30 PROCEDURE — 97110 THERAPEUTIC EXERCISES: CPT

## 2022-08-30 PROCEDURE — 97112 NEUROMUSCULAR REEDUCATION: CPT

## 2022-08-30 NOTE — PROGRESS NOTES
Daily Note     Today's date: 2022  Patient name: Lizzeth Schreiber  : 1965  MRN: [id number]  Referring provider: Sharon Baker, PT  Dx:   Encounter Diagnosis     ICD-10-CM    1  Right sided weakness  R53 1                   Subjective:   "Im ok"        Objective: See treatment diary below      Assessment: Tolerated treatment well  Patient exhibited good technique with therapeutic exercises      Plan: Continue per plan of care        Manuals 2022   Right Shoulder and wrist PROM and stretch                                Neuro Re-Ed         MTP/LTP        Side-Stepping 3x30 3x30  3x30' braiding 3x30' braiding  3x30"    Tandem walk  4x8 4x8'  4x8' On floor 4x8' 4x8'   Stance on foam 3x30 3x30 3x30" 3x30" 3x30'   Stance with EC 3x30 3x30  3x30" 3x30"  3x30"        grapevine 3x8 3x8'  3x8' 3x8' 3x8'   Supine bic  kicks 2x10 2x10 2x10 2x10 2x10   AROM Flex (R) supine 3x10 2x10 2x10 2x10 20   bridges 2x10 2x10 2x10 2x10 2x10   Cane flexion 2x10 2x10 2x10 2x10 2x10   Step overs (roll) lat, front/back        BOSU step overs with balance hold                        Ther Ex        Nustep  L6   10 min L2 10'  10 min L4   lunges R 3#, L 4# 2x10 R 3# L 3# 2x10 R 3#   L  4#  20 R 2#, L 3# 2x10 R  3#  L 4#   20 each   TR/HR 20 20x 20x R 2#, L 3# 2x10  2x10      Standing SLR 3-way 2x10 R 3#/L4# R 3# / L3# 2x10  R3# L2# 2x10 R 3#, L 4# 2x10 ea R 2# / L3# 2x10    LAQ 2x10 R 3#/L4# R 3#/ L3# 2x10  R3# L2# 2x10 3" 2x10 R3#/L4# R3# /L4#  2x10   Seated Marches 2x10 R3#/L4# R3# / L3#   2x10  R3# L3# 2x10 R 3#, L 4# 2x10 R 3#  L 4#   2x10   Seated HS curls with TB 2x10 BTB BTB 2x10 BTB  2x20 BTB 2x20  BTB  2x20   Sup/Pron Roller        Bicep Curls 2#  2x10  2#  2x10 2# 2x10 3# 2x10 3#  2x10   Right Side Shrugs with TB BTB 2x10 BTB 2x10 RTB 2x10 BTB 2x10 BTB  2x10   Seated hip abd GTB 2x10 BTB 2x10 GTB 2x10 BTB 2x10 BTB 2x10   squat 2x10 2x10  2x10 R 3#, L 4# 2x10 2x10  3#    pulleys 5 min  5 min 5 min 5min 5 min    UBE 5 min  5 min 5 min 5 min   Cat Cow 2x10 w/ hand OP 2x10 w/ hand OP 2x10  2x10 2x10   Bike 10' 10 min 10 min 10 min 10 min   HS curl Machine 50#  25x #50, x25 50# 25x 50# 25x 50# 25x   TM     04 mph  5'      04 mph   5'     Ther Activity        Steps                Gait Training                        Modalities

## 2022-09-01 ENCOUNTER — APPOINTMENT (OUTPATIENT)
Dept: PHYSICAL THERAPY | Facility: CLINIC | Age: 57
End: 2022-09-01
Payer: COMMERCIAL

## 2022-09-02 ENCOUNTER — APPOINTMENT (OUTPATIENT)
Dept: PHYSICAL THERAPY | Facility: CLINIC | Age: 57
End: 2022-09-02
Payer: COMMERCIAL

## 2022-09-06 ENCOUNTER — OFFICE VISIT (OUTPATIENT)
Dept: PHYSICAL THERAPY | Facility: CLINIC | Age: 57
End: 2022-09-06
Payer: COMMERCIAL

## 2022-09-06 DIAGNOSIS — R53.1 RIGHT SIDED WEAKNESS: Primary | ICD-10-CM

## 2022-09-06 PROCEDURE — 97110 THERAPEUTIC EXERCISES: CPT

## 2022-09-06 PROCEDURE — 97112 NEUROMUSCULAR REEDUCATION: CPT

## 2022-09-06 NOTE — PROGRESS NOTES
Daily Note     Today's date: 2022  Patient name: Khushi Sanchez  : 1965  MRN: [id number]  Referring provider: Zackary Olivares, PT  Dx:   Encounter Diagnosis     ICD-10-CM    1  Right sided weakness  R53 1                   Subjective:   Im doing better"        Objective: See treatment diary below      Assessment: Tolerated treatment well  Patient exhibited good technique with therapeutic exercises   Pt continues to report slow but consistent improvement in strength and functional abilities     She would benefit from progressive strengthening      Plan: Continue per plan of care        Manuals 2022   Right Shoulder and wrist PROM and stretch                                Neuro Re-Ed         MTP/LTP        Side-Stepping 3x30 3x30  3x30' braiding 3x30' braiding  3x30"    Tandem walk  4x8 4x8'  4x8' On floor 4x8' 4x8'   Stance on foam 3x30 3x30 3x30" 3x30" 3x30'   Stance with EC 3x30 3x30  3x30" 3x30"  3x30"        grapevine 3x8 3x8'  3x8' 3x8' 3x8'   Supine bic  kicks 2x10 2x10 2x10 2x10 2x10   AROM Flex (R) supine 3x10 2x10 2x10 2x10 20   bridges 2x10 2x10 2x10 2x10 2x10   Cane flexion 2x10 2x10 2x10 2x10 2x10   Step overs (roll) lat, front/back        BOSU step overs with balance hold                        Ther Ex        Nustep  L6   10 min L2 10'  10 min L4   lunges R 3#, L 4# 2x10 R 3# L 3# 2x10 R 3#   L  4#  20 R 2#, L 3# 2x10 R  3#  L 4#   20 each   TR/HR 20 20x 20x R 2#, L 3# 2x10  2x10      Standing SLR 3-way 2x10 R 3#/L4# R 3# / L3# 2x10  R3# L2# 2x10 R 3#, L 4# 2x10 ea R 2# / L3# 2x10    LAQ 2x10 R 3#/L4# R 3#/ L3# 2x10  R3# L2# 2x10 3" 2x10 R3#/L4# R3# /L4#  2x10   Seated Marches 2x10 R3#/L4# R3# / L3#   2x10  R3# L3# 2x10 R 3#, L 4# 2x10 R 3#  L 4#   2x10   Seated HS curls with TB 2x10 BTB BTB 2x10 BTB  2x20 BTB 2x20  BTB  2x20   Sup/Pron Roller        Bicep Curls 3#  2x10  2#  2x10 2# 2x10 3# 2x10 3#  2x10   Right Side Shrugs with TB BTB 2x10 BTB 2x10 RTB 2x10 BTB 2x10 BTB  2x10   Seated hip abd BTB 2x10 BTB 2x10 GTB 2x10 BTB 2x10 BTB 2x10   squat 2x10 2x10  2x10 R 3#, L 4# 2x10 2x10  3#    pulleys 5 min  5 min  5 min 5min 5 min    UBE 5 min  5 min 5 min 5 min   Cat Cow 2x10 w/ hand OP 2x10 w/ hand OP 2x10  2x10 2x10   Bike 10' 10 min 10 min 10 min 10 min   HS curl Machine 50#  25x #50, x25 50# 25x 50# 25x 50# 25x   TM     04 mph  10'      04 mph   5'     Ther Activity        Steps                Gait Training                        Modalities

## 2022-09-08 ENCOUNTER — OFFICE VISIT (OUTPATIENT)
Dept: PHYSICAL THERAPY | Facility: CLINIC | Age: 57
End: 2022-09-08
Payer: COMMERCIAL

## 2022-09-08 DIAGNOSIS — R53.1 RIGHT SIDED WEAKNESS: Primary | ICD-10-CM

## 2022-09-08 DIAGNOSIS — Z86.73 HISTORY OF STROKE: ICD-10-CM

## 2022-09-08 DIAGNOSIS — R26.9 ABNORMAL GAIT: ICD-10-CM

## 2022-09-08 PROCEDURE — 97110 THERAPEUTIC EXERCISES: CPT | Performed by: PHYSICAL THERAPIST

## 2022-09-08 PROCEDURE — 97112 NEUROMUSCULAR REEDUCATION: CPT | Performed by: PHYSICAL THERAPIST

## 2022-09-08 NOTE — PROGRESS NOTES
PT Re-Evaluation     Today's date: 2022  Patient name: Jacobo Oneal  : 1965  MRN: [id number]  Referring provider: Randy Juarez, PT  Dx:   Encounter Diagnosis     ICD-10-CM    1  Right sided weakness  R53 1        Start Time: 0800  Stop Time: 0855  Total time in clinic (min): 55 minutes    Assessment  Assessment details: Patient continues to make progress with PT POC  Patient is showing improved strength t/o B/L upper and lower extremity  Patient's endurance and activity tolerance continue to improve with exercise program  Patient would benefit from continued PT intervention x 4 weeks then plan re-assessment for possible d/c to HEP/wellness program    Impairments: abnormal gait, abnormal muscle tone, abnormal or restricted ROM, activity intolerance, impaired balance, impaired physical strength, lacks appropriate home exercise program, safety issue, weight-bearing intolerance and poor posture     Goals  ST  Initiate HEP- Met   2  Patient to increase right UE and LE strength to 4-/5 t/o in 4 weeks- Progressing    LT  Patient to increase right UE and LE strength to 4+/5 in 8 weeks- Progressing  2  Patient to improve ambulation and balance to [facility] in 8 weeks- Progressing  3   Patient to increase endurance to [facility] in 8 weeks- Progressing    Plan  Patient would benefit from: skilled physical therapy  Planned modality interventions: cryotherapy and thermotherapy: hydrocollator packs  Other planned modality interventions: Modalities PRN  Planned therapy interventions: IADL retraining, ADL retraining, manual therapy, balance, balance/weight bearing training, neuromuscular re-education, patient education, postural training, strengthening, stretching, therapeutic activities, therapeutic exercise, therapeutic training, transfer training, gait training, functional ROM exercises, flexibility, graded activity, graded exercise and home exercise program  Frequency: 2x week  Duration in visits: 8  Duration in weeks: 4  Treatment plan discussed with: patient        Subjective Evaluation    History of Present Illness  Date of onset: 6/27/2021  Pain  Aggravating factors: overhead activity, lifting and walking  Progression: improved    Social Support  Stairs in house: yes   15    Patient Goals  Patient goal: To work on my arm and get some strength back in my legs        Objective     Static Posture     Shoulders  Depressed  Neurological Testing     Sensation     Shoulder   Left Shoulder   Intact: light touch    Right Shoulder   Intact: light touch    Wrist/Hand   Left   Intact: light touch    Right   Diminished: light touch  Paresthesia: light touch    Active Range of Motion   Left Shoulder   Normal active range of motion    Right Shoulder   Flexion: 60 degrees   Abduction: 90 degrees     Left Elbow   Normal active range of motion    Right Elbow   Flexion: WFL  Extension: WFL    Additional Active Range of Motion Details  Right wrist PROM limited 50%  Patient with limited finger extension, she states her hand and fingers will continuously flex up on her    Passive Range of Motion   Left Shoulder   Normal passive range of motion    Right Shoulder   Flexion: 90 degrees   Abduction: 100 degrees     Right Wrist   Normal passive range of motion    Additional Passive Range of Motion Details  Full PROM in right hand    Strength/Myotome Testing     Right Shoulder     Planes of Motion   Flexion: 3-   Abduction: 3-   External rotation at 0°: 3-   Internal rotation at 0°: 3-     Right Elbow   Flexion: 3  Extension: 3  Forearm supination: 3  Forearm pronation: 3    Left Wrist/Hand   Normal wrist strength    Right Wrist/Hand   Wrist extension: 3-  Wrist flexion: 3-  Radial deviation: 3-  Ulnar deviation: 3-    Ambulation     Ambulation: Level Surfaces   Ambulation without assistive device: independent    Observational Gait   Gait: asymmetric   Decreased walking speed, stride length, right stance time and right step length     Right foot contact pattern: foot flat    Additional Observational Gait Details  Patient using AFO brace for foot drop on right

## 2022-09-08 NOTE — PROGRESS NOTES
Daily Note     Today's date: 2022  Patient name: Blanco Russ  : 1965  MRN: [id number]  Referring provider: Derrek Betts PT  Dx:   Encounter Diagnosis     ICD-10-CM    1  Right sided weakness  R53 1    2  History of stroke  Z86 73    3  Abnormal gait  R26 9                   Subjective: Patient states "I'm doing ok today"  Objective: See treatment diary below      Assessment: Tolerated treatment well  Patient exhibited good technique with therapeutic exercises  Patient continues to show improved activity tolerance and endurance  Plan: Continue per plan of care        Manuals 2022   Right Shoulder and wrist PROM and stretch                                Neuro Re-Ed         MTP/LTP        Side-Stepping 3x30 3x30  3x30' braiding 3x30' braiding  3x30"    Tandem walk  4x8 4x8'  4x8' On floor 4x8' 4x8'   Stance on foam 3x30 3x30 3x30" 3x30" 3x30'   Stance with EC 3x30 3x30  3x30" 3x30"  3x30"        grapevine 3x8 3x8'  3x8' 3x8' 3x8'   Supine bic  kicks 2x10 2x10 2x10 2x10 2x10   AROM Flex (R) supine 3x10 2x10 2x10 2x10 20   bridges 2x10 2x10 2x10 2x10 2x10   Cane flexion 2x10 2x10 2x10 2x10 2x10   Step overs (roll) lat, front/back        BOSU step overs with balance hold                        Ther Ex        Nustep  L6   10 min L2 10'  10 min L4   lunges R 3#, L 4# 2x10 R 3# L 4# 2x10 R 3#   L  4#  20 R 2#, L 3# 2x10 R  3#  L 4#   20 each   TR/HR 20 20x 20x R 2#, L 3# 2x10  2x10      Standing SLR 3-way 2x10 R 3#/L4# R 3# / L 4# 2x10  R3# L2# 2x10 R 3#, L 4# 2x10 ea R 2# / L3# 2x10    LAQ 2x10 R 3#/L4# R 3#/ L4# 2x10  R3# L2# 2x10 3" 2x10 R3#/L4# R3# /L4#  2x10   Seated Marches 2x10 R3#/L4# R3# / L4#   2x10  R3# L3# 2x10 R 3#, L 4# 2x10 R 3#  L 4#   2x10   Seated HS curls with TB 2x10 BTB BTB 2x10 BTB  2x20 BTB 2x20  BTB  2x20   Sup/Pron Roller        Bicep Curls 3#  2x10  3#  2x10 2# 2x10 3# 2x10 3#  2x10   Right Side Shrugs with TB BTB 2x10 BTB 2x10 RTB 2x10 BTB 2x10 BTB  2x10   Seated hip abd BTB 2x10 BTB 2x10 GTB 2x10 BTB 2x10 BTB 2x10   squat 2x10 2x10  2x10 R 3#, L 4# 2x10 2x10  3#    pulleys 5 min  5 min  5 min 5min 5 min    UBE 5 min 5 min 5 min 5 min 5 min   Cat Cow 2x10 w/ hand OP 2x10 w/ hand OP 2x10  2x10 2x10   Bike 10' 10 min 10 min 10 min 10 min   HS curl Machine 50#  25x #50, x25 50# 25x 50# 25x 50# 25x   TM     04 mph  10'   04 MPH 10 min     04 mph   5'     Ther Activity        Steps                Gait Training                        Modalities

## 2022-09-13 ENCOUNTER — OFFICE VISIT (OUTPATIENT)
Dept: PHYSICAL THERAPY | Facility: CLINIC | Age: 57
End: 2022-09-13
Payer: COMMERCIAL

## 2022-09-13 DIAGNOSIS — R26.9 ABNORMAL GAIT: ICD-10-CM

## 2022-09-13 DIAGNOSIS — Z86.73 HISTORY OF STROKE: ICD-10-CM

## 2022-09-13 DIAGNOSIS — R53.1 RIGHT SIDED WEAKNESS: Primary | ICD-10-CM

## 2022-09-13 PROCEDURE — 97112 NEUROMUSCULAR REEDUCATION: CPT | Performed by: PHYSICAL THERAPIST

## 2022-09-13 PROCEDURE — 97110 THERAPEUTIC EXERCISES: CPT | Performed by: PHYSICAL THERAPIST

## 2022-09-13 NOTE — PROGRESS NOTES
Daily Note     Today's date: 2022  Patient name: Lemuel Willett  : 1965  MRN: [id number]  Referring provider: Jazmín Vazquez, PT  Dx:   Encounter Diagnosis     ICD-10-CM    1  Right sided weakness  R53 1    2  History of stroke  Z86 73    3  Abnormal gait  R26 9                   Subjective: Patient without new c/o today  Objective: See treatment diary below      Assessment: Tolerated treatment well  Patient exhibited good technique with therapeutic exercises      Plan: Continue per plan of care        Manuals 2022   Right Shoulder and wrist PROM and stretch                                Neuro Re-Ed         MTP/LTP        Side-Stepping 3x30 3x30  3x30' braiding 3x30' braiding  3x30"    Tandem walk  4x8 4x8'  4x8' On floor 4x8' 4x8'   Stance on foam 3x30 3x30 3x30" 3x30" 3x30'   Stance with EC 3x30 3x30  3x30" 3x30"  3x30"        grapevine 3x8 3x8'  3x8' 3x8' 3x8'   Supine bic  kicks 2x10 2x10 2x10 2x10 2x10   AROM Flex (R) supine 3x10 2x10 2x10 2x10 20   bridges 2x10 2x10 2x10 2x10 2x10   Cane flexion 2x10 2x10 2x10 2x10 2x10   Step overs (roll) lat, front/back        BOSU step overs with balance hold                        Ther Ex        Nustep  L6   10 min L2 10'  10 min L4   lunges R 3#, L 4# 2x10 R 3# L 4# 2x10 R 3#   L  4#  20 R 2#, L 3# 2x10 R  3#  L 4#   20 each   TR/HR 20 20x 20x R 2#, L 3# 2x10  2x10      Standing SLR 3-way 2x10 R 3#/L4# R 3# / L 4# 2x10  R3# L4# 2x10 R 3#, L 4# 2x10 ea R 2# / L3# 2x10    LAQ 2x10 R 3#/L4# R 3#/ L4# 2x10  R3# L4# 2x10 3" 2x10 R3#/L4# R3# /L4#  2x10   Seated Marches 2x10 R3#/L4# R3# / L4#   2x10  R3# L4# 2x10 R 3#, L 4# 2x10 R 3#  L 4#   2x10   Seated HS curls with TB 2x10 BTB BTB 2x10 BTB  2x20 BTB 2x20  BTB  2x20   Sup/Pron Roller        Bicep Curls 3#  2x10  3#  2x10 3# 2x10 3# 2x10 3#  2x10   Right Side Shrugs with TB BTB 2x10 BTB 2x10 RTB 2x10 BTB 2x10 BTB  2x10   Seated hip abd BTB 2x10 BTB 2x10 GTB 2x10 BTB 2x10 BTB 2x10   squat 2x10 2x10  2x10 R 3#, L 4# 2x10 2x10  3#    pulleys 5 min  5 min  5 min 5min 5 min    UBE 5 min 5 min 5 min 5 min 5 min   Cat Cow 2x10 w/ hand OP 2x10 w/ hand OP 2x10  2x10 2x10   Bike 10' 10 min 10 min 10 min 10 min   HS curl Machine 50#  25x #50, x25 50# 25x 50# 25x 50# 25x   TM     04 mph  10'   04 MPH 10 min NV   04 mph   5'     Ther Activity        Steps                Gait Training                        Modalities

## 2022-09-15 ENCOUNTER — OFFICE VISIT (OUTPATIENT)
Dept: PHYSICAL THERAPY | Facility: CLINIC | Age: 57
End: 2022-09-15
Payer: COMMERCIAL

## 2022-09-15 DIAGNOSIS — R53.1 RIGHT SIDED WEAKNESS: Primary | ICD-10-CM

## 2022-09-15 PROCEDURE — 97112 NEUROMUSCULAR REEDUCATION: CPT

## 2022-09-15 PROCEDURE — 97110 THERAPEUTIC EXERCISES: CPT

## 2022-09-15 NOTE — PROGRESS NOTES
Daily Note     Today's date: 9/15/2022  Patient name: Nathaniel Flores  : 1965  MRN: [id number]  Referring provider: Sudhakar Fragoso, CHARLIE  Dx: No diagnosis found  Subjective:  "Im doing better"        Objective: See treatment diary below      Assessment: Tolerated treatment well  Patient exhibited good technique with therapeutic exercises      Plan: Continue per plan of care        Manuals 2022 9/8 2022 9/15   2022   Right Shoulder and wrist PROM and stretch                                Neuro Re-Ed         MTP/LTP        Side-Stepping 3x30 3x30  3x30' braiding 3x30' braiding  3x30"    Tandem walk  4x8 4x8'  4x8' On floor 4x8' 4x8'   Stance on foam 3x30 3x30 3x30" 3x30" 3x30'   Stance with EC 3x30 3x30  3x30" 3x30"  3x30"        grapevine 3x8 3x8'  3x8' 3x8' 3x8'   Supine bic  kicks 2x10 2x10 2x10 2x10 2x10   AROM Flex (R) supine 3x10 2x10 2x10 2x10 20   bridges 2x10 2x10 2x10 2x10 2x10   Cane flexion 2x10 2x10 2x10 2x10 2x10   Step overs (roll) lat, front/back        BOSU step overs with balance hold                        Ther Ex        Nustep  L6   10 min L2 10'  10 min L4   lunges R 3#, L 4# 2x10 R 3# L 4# 2x10 R 3#   L  4#  20 R 2#, L 3# 2x10 R  3#  L 4#   20 each   TR/HR 20 20x 20x 20 2x10      Standing SLR 3-way 2x10 R 3#/L4# R 3# / L 4# 2x10  R3# L4# 2x10 R 3#, L 4# 2x10 ea R 2# / L3# 2x10    LAQ 2x10 R 3#/L4# R 3#/ L4# 2x10  R3# L4# 2x10 3" 2x10 R3#/L4# R3# /L4#  2x10   Seated Marches 2x10 R3#/L4# R3# / L4#   2x10  R3# L4# 2x10 R 3#, L 4# 2x10 R 3#  L 4#   2x10   Seated HS curls with TB 2x10 BTB BTB 2x10 BTB  2x20 BTB 2x20  BTB  2x20   Sup/Pron Roller        Bicep Curls 3#  2x10  3#  2x10 3# 2x10 3# 2x10 3#  2x10   Right Side Shrugs with TB BTB 2x10 BTB 2x10 RTB 2x10 BTB 2x10 BTB  2x10   Seated hip abd BTB 2x10 BTB 2x10 GTB 2x10 BTB 2x10 BTB 2x10   squat 2x10 2x10  2x10 R 3#, L 4# 2x10 2x10  3#    pulleys 5 min  5 min  5 min 5min 5 min    UBE 5 min 5 min 5 min 5 min 5 min Cat Cow 2x10 w/ hand OP 2x10 w/ hand OP 2x10  2x10 2x10   Bike 10' 10 min 10 min 10 min 10 min   HS curl Machine 50#  25x #50, x25 50# 25x 50# 25x 50# 25x   TM     04 mph  10'   04 MPH 10 min NV   04 mph   5'     Ther Activity        Steps                Gait Training                        Modalities

## 2022-09-19 NOTE — PROGRESS NOTES
Daily Note     Today's date: 2022  Patient name: Garrison Shaw  : 1965  MRN: [id number]  Referring provider: Gena Saucedo, PT  Dx:   Encounter Diagnosis     ICD-10-CM    1  Right sided weakness  R53 1    2  History of stroke  Z86 73    3  Abnormal gait  R26 9                   Subjective: The patient states that she is doing good today, offers no new complaints at start of session  Objective: See treatment diary below      Assessment: Tolerated treatment well  Improving endurance noted but some rest breaks needed during session  Patient demonstrated fatigue post treatment and would benefit from continued PT to improve function and mobility  Plan: Continue per plan of care        Manuals 2022 9/8 2022 9/15   9/20   Right Shoulder and wrist PROM and stretch                                Neuro Re-Ed         MTP/LTP        Side-Stepping 3x30 3x30  3x30' braiding 3x30' braiding  3x30' braiding   Tandem walk  4x8 4x8'  4x8' On floor 4x8' 4x8' On floor   Stance on foam 3x30 3x30 3x30" 3x30" 3x30"   Stance with EC 3x30 3x30  3x30" 3x30"  3x30"        grapevine 3x8 3x8'  3x8' 3x8' 3x8'   Supine bic  kicks 2x10 2x10 2x10 2x10 2x10   AROM Flex (R) supine 3x10 2x10 2x10 2x10 2x10   bridges 2x10 2x10 2x10 2x10 2x10   Cane flexion 2x10 2x10 2x10 2x10 2x10   Step overs (roll) lat, front/back        BOSU step overs with balance hold                        Ther Ex        Nustep  L6   10 min L2 10'  L4 10 mins   lunges R 3#, L 4# 2x10 R 3# L 4# 2x10 R 3#   L  4#  20 R 2#, L 3# 2x10 R  2#  L 3#   20 each   TR/HR 20 20x 20x 20 2x10 ea   Standing SLR 3-way 2x10 R 3#/L4# R 3# / L 4# 2x10  R3# L4# 2x10 R 3#, L 4# 2x10 ea R 3# / L 4# 2x10    LAQ 2x10 R 3#/L4# R 3#/ L4# 2x10  R3# L4# 2x10 3" 2x10 R3#/L4# R 3# / L 4#  2x10   Seated Marches 2x10 R3#/L4# R3# / L4#   2x10  R3# L4# 2x10 R 3#, L 4# 2x10 R 3#  L 4#   2x10   Seated HS curls with TB 2x10 BTB BTB 2x10 BTB  2x20 BTB 2x20  BTB  2x20   Sup/Pron Roller        Bicep Curls 3#  2x10  3#  2x10 3# 2x10 3# 2x10 3#  2x10   Right Side Shrugs with TB BTB 2x10 BTB 2x10 RTB 2x10 BTB 2x10 BTB  2x10   Seated hip abd BTB 2x10 BTB 2x10 GTB 2x10 BTB 2x10 BTB 2x10   squat 2x10 2x10  2x10 R 3#, L 4# 2x10 R 3#, L 4# 2x10   pulleys 5 min  5 min  5 min 5min 5 min    UBE 5 min 5 min 5 min 5 min 5 min   Cat Cow 2x10 w/ hand OP 2x10 w/ hand OP 2x10  2x10 2x10   Bike 10' 10 min 10 min 10 min 10 min   HS curl Machine 50#  25x #50, x25 50# 25x 50# 25x 50# 25x   TM     04 mph  10'   04 MPH 10 min NV   04 mph 10 mins   Ther Activity        Steps                Gait Training                        Modalities

## 2022-09-20 ENCOUNTER — OFFICE VISIT (OUTPATIENT)
Dept: PHYSICAL THERAPY | Facility: CLINIC | Age: 57
End: 2022-09-20
Payer: COMMERCIAL

## 2022-09-20 DIAGNOSIS — Z86.73 HISTORY OF STROKE: ICD-10-CM

## 2022-09-20 DIAGNOSIS — R53.1 RIGHT SIDED WEAKNESS: Primary | ICD-10-CM

## 2022-09-20 DIAGNOSIS — R26.9 ABNORMAL GAIT: ICD-10-CM

## 2022-09-20 PROCEDURE — 97112 NEUROMUSCULAR REEDUCATION: CPT | Performed by: PHYSICAL THERAPIST

## 2022-09-20 PROCEDURE — 97110 THERAPEUTIC EXERCISES: CPT | Performed by: PHYSICAL THERAPIST

## 2022-09-22 ENCOUNTER — OFFICE VISIT (OUTPATIENT)
Dept: PHYSICAL THERAPY | Facility: CLINIC | Age: 57
End: 2022-09-22
Payer: COMMERCIAL

## 2022-09-22 DIAGNOSIS — Z86.73 HISTORY OF STROKE: ICD-10-CM

## 2022-09-22 DIAGNOSIS — R53.1 RIGHT SIDED WEAKNESS: Primary | ICD-10-CM

## 2022-09-22 PROCEDURE — 97112 NEUROMUSCULAR REEDUCATION: CPT

## 2022-09-22 PROCEDURE — 97110 THERAPEUTIC EXERCISES: CPT

## 2022-09-22 NOTE — PROGRESS NOTES
Daily Note     Today's date: 2022  Patient name: Jemma Mathew  : 1965  MRN: [id number]  Referring provider: Fely Snyder PT  Dx:   Encounter Diagnosis     ICD-10-CM    1  Right sided weakness  R53 1    2  History of stroke  Z86 73                   Subjective: Patient reports "I'm doing okay today "      Objective: See treatment diary below      Assessment: Tolerated treatment well  Patient exhibited good technique with therapeutic exercises      Plan: Continue per plan of care        Manuals 2022 9/8 2022 9/15   9/20   Right Shoulder and wrist PROM and stretch                                Neuro Re-Ed         MTP/LTP        Side-Stepping 3x30 braiding 3x30  3x30' braiding 3x30' braiding  3x30' braiding   Tandem walk  4x8 on floor 4x8'  4x8' On floor 4x8' 4x8' On floor   Stance on foam 3x30 3x30 3x30" 3x30" 3x30"   Stance with EC 3x30 3x30  3x30" 3x30"  3x30"        xgrapevine  3x8'  3x8' 3x8' 3x8'   Supine bic  kicks  2x10 2x10 2x10 2x10   AROM Flex (R) supine 3x10 2x10 2x10 2x10 2x10   bridges 2x10 2x10 2x10 2x10 2x10   Cane flexion 2x10 2x10 2x10 2x10 2x10   Step overs (roll) lat, front/back        BOSU step overs with balance hold                        Ther Ex        Nustep  L6   10 min L2 10'  L4 10 mins   lunges R 3#, L 4# 2x10 R 3# L 4# 2x10 R 3#   L  4#  20 R 2#, L 3# 2x10 R  2#  L 3#   20 each   TR/HR 20x 20x 20x 20 2x10 ea   Standing SLR 3-way 2x10 R 3#/L4# R 3# / L 4# 2x10  R3# L4# 2x10 R 3#, L 4# 2x10 ea R 3# / L 4# 2x10    LAQ 2x10 R 3#/L4# R 3#/ L4# 2x10  R3# L4# 2x10 3" 2x10 R3#/L4# R 3# / L 4#  2x10   Seated Marches 2x10 R3#/L4# R3# / L4#   2x10  R3# L4# 2x10 R 3#, L 4# 2x10 R 3#  L 4#   2x10   Seated HS curls with TB 2x10 BTB BTB 2x10 BTB  2x20 BTB 2x20  BTB  2x20   Sup/Pron Roller        Bicep Curls 3#  2x10  3#  2x10 3# 2x10 3# 2x10 3#  2x10   Right Side Shrugs with TB BTB 2x10 BTB 2x10 RTB 2x10 BTB 2x10 BTB  2x10   Seated hip abd BTB 2x10 BTB 2x10 GTB 2x10 BTB 2x10 BTB 2x10   squat  2x10  2x10 R 3#, L 4# 2x10 R 3#, L 4# 2x10   pulleys 5 min  5 min  5 min 5min 5 min    UBE 5 min 5 min 5 min 5 min 5 min   Cat Cow  2x10 w/ hand OP 2x10  2x10 2x10   Bike 10' 10 min 10 min 10 min 10 min   HS curl Machine 50#  25x #50, x25 50# 25x 50# 25x 50# 25x   TM     04 MPH 10 min NV   04 mph 10 mins   Ther Activity        Steps                Gait Training                        Modalities

## 2022-09-27 ENCOUNTER — OFFICE VISIT (OUTPATIENT)
Dept: PHYSICAL THERAPY | Facility: CLINIC | Age: 57
End: 2022-09-27
Payer: COMMERCIAL

## 2022-09-27 DIAGNOSIS — R26.9 ABNORMAL GAIT: ICD-10-CM

## 2022-09-27 DIAGNOSIS — R53.1 RIGHT SIDED WEAKNESS: Primary | ICD-10-CM

## 2022-09-27 DIAGNOSIS — Z86.73 HISTORY OF STROKE: ICD-10-CM

## 2022-09-27 PROCEDURE — 97110 THERAPEUTIC EXERCISES: CPT

## 2022-09-27 PROCEDURE — 97112 NEUROMUSCULAR REEDUCATION: CPT

## 2022-09-27 NOTE — PROGRESS NOTES
Daily Note     Today's date: 2022  Patient name: Jemma Mathew  : 1965  MRN: [id number]  Referring provider: Fely Snyder, PT  Dx:   Encounter Diagnosis     ICD-10-CM    1  Right sided weakness  R53 1    2  History of stroke  Z86 73    3  Abnormal gait  R26 9                   Subjective: Patient offers no new complaints at beginning of session  Objective: See treatment diary below      Assessment: Tolerated treatment well without complaint  Patient most challenged by R side shrugs with TB  Patient adequately challenged by balance activities at this time  Patient would benefit from continued PT to increase R sided strength and coordination for improved function and gait  Plan: Continue per plan of care        Manuals 2022 9/27/22 2022 9/15   9/20   Right Shoulder and wrist PROM and stretch                                Neuro Re-Ed         MTP/LTP        Side-Stepping 3x30 braiding 3x30 braiding 3x30' braiding 3x30' braiding  3x30' braiding   Tandem walk  4x8 on floor 4x8' 4x8' On floor 4x8' 4x8' On floor   Stance on foam 3x30 SLS 3x30" ea 3x30" 3x30" 3x30"   Stance with EC 3x30 3x30" 3x30" 3x30"  3x30"        xgrapevine   3x8' 3x8' 3x8'   Supine bic  kicks   2x10 2x10 2x10   AROM Flex (R) supine 3x10 3x10 2x10 2x10 2x10   bridges 2x10 2x10 2x10 2x10 2x10   Cane flexion 2x10 2x10 2x10 2x10 2x10   Step overs (roll) lat, front/back        BOSU step overs with balance hold                        Ther Ex        Nustep  L4 10' L2 10'  L4 10 mins   lunges R 3#, L 4# 2x10 R 3#, L 4# 2x10 R 3#   L  4#  20 R 2#, L 3# 2x10 R  2#  L 3#   20 each   TR/HR 20x 2x10 20x 20 2x10 ea   Standing SLR 3-way 2x10 R 3#/L4# 2x10 R3#/L4# R3# L4# 2x10 R 3#, L 4# 2x10 ea R 3# / L 4# 2x10    LAQ 2x10 R 3#/L4# 2x10 R3#/L4# R3# L4# 2x10 3" 2x10 R3#/L4# R 3# / L 4#  2x10   Seated Marches 2x10 R3#/L4# 2x10 R3#/L4# R3# L4# 2x10 R 3#, L 4# 2x10 R 3#  L 4#   2x10   Seated HS curls with TB 2x10 BTB BTB 2x10 BTB  2x20 BTB 2x20  BTB  2x20   Sup/Pron Roller        Bicep Curls 3#  2x10 3# 2x10 3# 2x10 3# 2x10 3#  2x10   Right Side Shrugs with TB BTB 2x10 BTB 2x10 RTB 2x10 BTB 2x10 BTB  2x10   Seated hip abd BTB 2x10 BTB 2x10 GTB 2x10 BTB 2x10 BTB 2x10   squat  R 3#, L 4# 2x10 2x10 R 3#, L 4# 2x10 R 3#, L 4# 2x10   pulleys 5 min  5 min 5 min 5min 5 min    UBE 5 min  5 min 5 min 5 min 5 min   Cat Cow   2x10  2x10 2x10   Bike 10' NuStep above 10 min 10 min 10 min   HS curl Machine 50#  25x 50#  25x 50# 25x 50# 25x 50# 25x   TM    NV   04 mph 10 mins   Ther Activity        Steps                Gait Training                        Modalities

## 2022-09-29 ENCOUNTER — OFFICE VISIT (OUTPATIENT)
Dept: PHYSICAL THERAPY | Facility: CLINIC | Age: 57
End: 2022-09-29
Payer: COMMERCIAL

## 2022-09-29 DIAGNOSIS — R53.1 RIGHT SIDED WEAKNESS: Primary | ICD-10-CM

## 2022-09-29 PROCEDURE — 97110 THERAPEUTIC EXERCISES: CPT

## 2022-09-29 PROCEDURE — 97112 NEUROMUSCULAR REEDUCATION: CPT

## 2022-09-29 NOTE — PROGRESS NOTES
Daily Note     Today's date: 2022  Patient name: Dolly Napier  : 1965  MRN: [id number]  Referring provider: Kishore Jones PT  Dx:   Encounter Diagnosis     ICD-10-CM    1  Right sided weakness  R53 1                   Subjective:  "Im doing good"        Objective: See treatment diary below      Assessment: Tolerated treatment well  Patient exhibited good technique with therapeutic exercises      Plan: Continue per plan of care        Manuals 2022 9/27/22 2022 9/15   9/20   Right Shoulder and wrist PROM and stretch                                Neuro Re-Ed         MTP/LTP        Side-Stepping 3x30 braiding 3x30 braiding 3x30' braiding 3x30' braiding  3x30' braiding   Tandem walk  4x8 on floor 4x8' 4x8' On floor 4x8' 4x8' On floor   Stance on foam 3x30 SLS 3x30" ea 3x30" 3x30" 3x30"   Stance with EC 3x30 3x30" 3x30" 3x30"  3x30"        xgrapevine    3x8' 3x8'   Supine bic  kicks    2x10 2x10   AROM Flex (R) supine 3x10 3x10 3x 10 2x10 2x10   bridges 2x10 2x10 2x10 2x10 2x10   Cane flexion 2x10 2x10 2x10 2x10 2x10   Step overs (roll) lat, front/back        BOSU step overs with balance hold                        Ther Ex        Nustep  L4 10' L4 10'  L4 10 mins   lunges R 3#, L 4# 2x10 R 3#, L 4# 2x10 R 3#   L  4#  20 R 2#, L 3# 2x10 R  2#  L 3#   20 each   TR/HR 20x 2x10 20x 20 2x10 ea   Standing SLR 3-way 2x10 R 3#/L4# 2x10 R3#/L4# R3# L4# 2x10 R 3#, L 4# 2x10 ea R 3# / L 4# 2x10    LAQ 2x10 R 3#/L4# 2x10 R3#/L4# R3# L4# 2x10 3" 2x10 R3#/L4# R 3# / L 4#  2x10   Seated Marches 2x10 R3#/L4# 2x10 R3#/L4# R3# L4# 2x10 R 3#, L 4# 2x10 R 3#  L 4#   2x10   Seated HS curls with TB 2x10 BTB BTB 2x10 BTB  2x20 BTB 2x20  BTB  2x20   Sup/Pron Roller        Bicep Curls 3#  2x10 3# 2x10 3# 2x10 3# 2x10 3#  2x10   Right Side Shrugs with TB BTB 2x10 BTB 2x10 RTB 2x10 BTB 2x10 BTB  2x10   Seated hip abd BTB 2x10 BTB 2x10 GTB 2x10 BTB 2x10 BTB 2x10   squat  R 3#, L 4# 2x10 2x10 R 3#, L 4# 2x10 R 3#, L 4# 2x10 pulleys 5 min  5 min 5 min 5min 5 min    UBE 5 min  5 min 5 min 5 min 5 min   Cat Cow   2x10  2x10 2x10   Bike 10' NuStep above 10 min 10 min 10 min   HS curl Machine 50#  25x 50#  25x 50# 25x 50# 25x 50# 25x   TM    NV   04 mph 10 mins   Ther Activity        Steps                Gait Training                        Modalities

## 2022-10-04 ENCOUNTER — OFFICE VISIT (OUTPATIENT)
Dept: PHYSICAL THERAPY | Facility: CLINIC | Age: 57
End: 2022-10-04
Payer: COMMERCIAL

## 2022-10-04 DIAGNOSIS — Z86.73 HISTORY OF STROKE: ICD-10-CM

## 2022-10-04 DIAGNOSIS — R53.1 RIGHT SIDED WEAKNESS: Primary | ICD-10-CM

## 2022-10-04 DIAGNOSIS — R26.9 ABNORMAL GAIT: ICD-10-CM

## 2022-10-04 PROCEDURE — 97110 THERAPEUTIC EXERCISES: CPT | Performed by: PHYSICAL THERAPIST

## 2022-10-04 PROCEDURE — 97112 NEUROMUSCULAR REEDUCATION: CPT | Performed by: PHYSICAL THERAPIST

## 2022-10-04 PROCEDURE — 97164 PT RE-EVAL EST PLAN CARE: CPT | Performed by: PHYSICAL THERAPIST

## 2022-10-04 NOTE — PROGRESS NOTES
PT Re-Evaluation     Today's date: 10/4/2022  Patient name: Guido Cortes  : 1965  MRN: [id number]  Referring provider: Suzanne Mosley PT  Dx:   Encounter Diagnosis     ICD-10-CM    1  Right sided weakness  R53 1    2  History of stroke  Z86 73    3  Abnormal gait  R26 9                   Assessment  Assessment details: Patient has made good progress with PT POC however is reaching plateau of progress at this time  Patient's strength and balance has improved well compared to IE level  Patient will continue PT x 2 weeks then plan d/c to wellness program    Impairments: abnormal gait, abnormal muscle tone, abnormal or restricted ROM, activity intolerance, impaired balance, impaired physical strength, lacks appropriate home exercise program, safety issue, weight-bearing intolerance and poor posture     Goals  ST  Initiate HEP- Met   2  Patient to increase right UE and LE strength to 4-/5 t/o in 4 weeks- Progressing    LT  Patient to increase right UE and LE strength to 4+/5 in 8 weeks- Progressing  2  Patient to improve ambulation and balance to [facility] in 8 weeks- Progressing  3   Patient to increase endurance to [facility] in 8 weeks- Progressing    Plan  Patient would benefit from: skilled physical therapy  Planned modality interventions: cryotherapy and thermotherapy: hydrocollator packs  Other planned modality interventions: Modalities PRN  Planned therapy interventions: IADL retraining, ADL retraining, manual therapy, balance, balance/weight bearing training, neuromuscular re-education, patient education, postural training, strengthening, stretching, therapeutic activities, therapeutic exercise, therapeutic training, transfer training, gait training, functional ROM exercises, flexibility, graded activity, graded exercise and home exercise program  Frequency: 2x week  Duration in visits: 4  Duration in weeks: 2  Treatment plan discussed with: patient        Subjective Evaluation    History of Present Illness  Date of onset: 6/27/2021  Pain  Aggravating factors: overhead activity, lifting and walking  Progression: improved    Social Support  Stairs in house: yes   15    Patient Goals  Patient goal: To work on my arm and get some strength back in my legs        Objective     Static Posture     Shoulders  Depressed  Neurological Testing     Sensation     Shoulder   Left Shoulder   Intact: light touch    Right Shoulder   Intact: light touch    Wrist/Hand   Left   Intact: light touch    Right   Diminished: light touch  Paresthesia: light touch    Active Range of Motion   Left Shoulder   Normal active range of motion    Right Shoulder   Flexion: 60 degrees   Abduction: 90 degrees     Left Elbow   Normal active range of motion    Right Elbow   Flexion: WFL  Extension: WFL    Additional Active Range of Motion Details  Right wrist PROM limited 50%  Patient with limited finger extension, she states her hand and fingers will continuously flex up on her    Passive Range of Motion   Left Shoulder   Normal passive range of motion    Right Shoulder   Flexion: 90 degrees   Abduction: 100 degrees     Right Wrist   Normal passive range of motion    Additional Passive Range of Motion Details  Full PROM in right hand    Strength/Myotome Testing     Right Shoulder     Planes of Motion   Flexion: 3-   Abduction: 3-   External rotation at 0°: 3-   Internal rotation at 0°: 3-     Right Elbow   Flexion: 3  Extension: 3  Forearm supination: 3  Forearm pronation: 3    Left Wrist/Hand   Normal wrist strength    Right Wrist/Hand   Wrist extension: 3-  Wrist flexion: 3-  Radial deviation: 3-  Ulnar deviation: 3-    Ambulation     Ambulation: Level Surfaces   Ambulation without assistive device: independent    Observational Gait   Gait: asymmetric   Decreased walking speed, stride length, right stance time and right step length     Right foot contact pattern: foot flat    Additional Observational Gait Details  Patient using AFO brace for foot drop on right                Manuals 9/22/2022 9/27/22 9/29/2022 10/4 9/20   Right Shoulder and wrist PROM and stretch                                Neuro Re-Ed         MTP/LTP        Side-Stepping 3x30 braiding 3x30 braiding 3x30' braiding 3x30' braiding  3x30' braiding   Tandem walk  4x8 on floor 4x8' 4x8' On floor 4x8' 4x8' On floor   Stance on foam 3x30 SLS 3x30" ea 3x30" 3x30" 3x30"   Stance with EC 3x30 3x30" 3x30" 3x30"  3x30"        xgrapevine     3x8'   Supine bic  kicks     2x10   AROM Flex (R) supine 3x10 3x10 3x 10 2x10 2x10   bridges 2x10 2x10 2x10 2x10 2x10   Cane flexion 2x10 2x10 2x10 2x10 2x10   Step overs (roll) lat, front/back        BOSU step overs with balance hold                        Ther Ex        Nustep  L4 10' L4 10'  L4 10 mins   lunges R 3#, L 4# 2x10 R 3#, L 4# 2x10 R 3#   L  4#  20 R 2#, L 3# 2x10 R  2#  L 3#   20 each   TR/HR 20x 2x10 20x 20 2x10 ea   Standing SLR 3-way 2x10 R 3#/L4# 2x10 R3#/L4# R3# L4# 2x10 R 3#, L 4# 2x10 ea R 3# / L 4# 2x10    LAQ 2x10 R 3#/L4# 2x10 R3#/L4# R3# L4# 2x10 3" 2x10 R3#/L4# R 3# / L 4#  2x10   Seated Marches 2x10 R3#/L4# 2x10 R3#/L4# R3# L4# 2x10 R 3#, L 4# 2x10 R 3#  L 4#   2x10   Seated HS curls with TB 2x10 BTB BTB 2x10 BTB  2x20 BTB 2x20  BTB  2x20   Sup/Pron Roller        Bicep Curls 3#  2x10 3# 2x10 3# 2x10 3# 2x10 3#  2x10   Right Side Shrugs with TB BTB 2x10 BTB 2x10 RTB 2x10 BTB 2x10 BTB  2x10   Seated hip abd BTB 2x10 BTB 2x10 GTB 2x10 BTB 2x10 BTB 2x10   squat  R 3#, L 4# 2x10 2x10 R 3#, L 4# 2x10 R 3#, L 4# 2x10   pulleys 5 min  5 min 5 min 5min 5 min    UBE 5 min  5 min 5 min 5 min 5 min   Cat Cow   2x10  2x10 2x10   Bike 10' NuStep above 10 min 10 min 10 min   HS curl Machine 50#  25x 50#  25x 50# 25x 50# 25x 50# 25x   TM    NV   04 mph 10 mins   Ther Activity        Steps                Gait Training                        Modalities

## 2022-10-04 NOTE — LETTER
2022    Gay England MD  [address]  [address] [place] [address]    Patient: Gómez Ku   YOB: 1965   Date of Visit: 10/4/2022     Encounter Diagnosis     ICD-10-CM    1  Right sided weakness  R53 1    2  History of stroke  Z86 73    3  Abnormal gait  R26 9        Dear Dr Ta Grow: Thank you for your recent referral of Gómez Ku  Please review the attached evaluation summary from Christian's recent visit  Please verify that you agree with the plan of care by signing the attached order  If you have any questions or concerns, please do not hesitate to call  I sincerely appreciate the opportunity to share in the care of one of your patients and hope to have another opportunity to work with you in the near future  Sincerely,    Gabino Segura, PT      Referring Provider:      I certify that I have read the below Plan of Care and certify the need for these services furnished under this plan of treatment while under my care  Gay England MD  [address]  [address] [address] [place] [id number]  Via Fax: [phone]          PT Re-Evaluation     Today's date: 10/4/2022  Patient name: Gómez Ku  : 1965  MRN: [id number]  Referring provider: Lucy Amador, PT  Dx:   Encounter Diagnosis     ICD-10-CM    1  Right sided weakness  R53 1    2  History of stroke  Z86 73    3  Abnormal gait  R26 9                   Assessment  Assessment details: Patient has made good progress with PT POC however is reaching plateau of progress at this time  Patient's strength and balance has improved well compared to IE level   Patient will continue PT x 2 weeks then plan d/c to wellness program    Impairments: abnormal gait, abnormal muscle tone, abnormal or restricted ROM, activity intolerance, impaired balance, impaired physical strength, lacks appropriate home exercise program, safety issue, weight-bearing intolerance and poor posture Goals  ST  Initiate HEP- Met   2  Patient to increase right UE and LE strength to 4-/5 t/o in 4 weeks- Progressing    LT  Patient to increase right UE and LE strength to 4+/5 in 8 weeks- Progressing  2  Patient to improve ambulation and balance to [facility] in 8 weeks- Progressing  3  Patient to increase endurance to [facility] in 8 weeks- Progressing    Plan  Patient would benefit from: skilled physical therapy  Planned modality interventions: cryotherapy and thermotherapy: hydrocollator packs  Other planned modality interventions: Modalities PRN  Planned therapy interventions: IADL retraining, ADL retraining, manual therapy, balance, balance/weight bearing training, neuromuscular re-education, patient education, postural training, strengthening, stretching, therapeutic activities, therapeutic exercise, therapeutic training, transfer training, gait training, functional ROM exercises, flexibility, graded activity, graded exercise and home exercise program  Frequency: 2x week  Duration in visits: 4  Duration in weeks: 2  Treatment plan discussed with: patient        Subjective Evaluation    History of Present Illness  Date of onset: 2021  Pain  Aggravating factors: overhead activity, lifting and walking  Progression: improved    Social Support  Stairs in house: yes   15    Patient Goals  Patient goal: To work on my arm and get some strength back in my legs        Objective     Static Posture     Shoulders  Depressed      Neurological Testing     Sensation     Shoulder   Left Shoulder   Intact: light touch    Right Shoulder   Intact: light touch    Wrist/Hand   Left   Intact: light touch    Right   Diminished: light touch  Paresthesia: light touch    Active Range of Motion   Left Shoulder   Normal active range of motion    Right Shoulder   Flexion: 60 degrees   Abduction: 90 degrees     Left Elbow   Normal active range of motion    Right Elbow   Flexion: WFL  Extension: [facility]    Additional Active Range of Motion Details  Right wrist PROM limited 50%  Patient with limited finger extension, she states her hand and fingers will continuously flex up on her    Passive Range of Motion   Left Shoulder   Normal passive range of motion    Right Shoulder   Flexion: 90 degrees   Abduction: 100 degrees     Right Wrist   Normal passive range of motion    Additional Passive Range of Motion Details  Full PROM in right hand    Strength/Myotome Testing     Right Shoulder     Planes of Motion   Flexion: 3-   Abduction: 3-   External rotation at 0°: 3-   Internal rotation at 0°: 3-     Right Elbow   Flexion: 3  Extension: 3  Forearm supination: 3  Forearm pronation: 3    Left Wrist/Hand   Normal wrist strength    Right Wrist/Hand   Wrist extension: 3-  Wrist flexion: 3-  Radial deviation: 3-  Ulnar deviation: 3-    Ambulation     Ambulation: Level Surfaces   Ambulation without assistive device: independent    Observational Gait   Gait: asymmetric   Decreased walking speed, stride length, right stance time and right step length     Right foot contact pattern: foot flat    Additional Observational Gait Details  Patient using AFO brace for foot drop on right                Manuals 9/22/2022 9/27/22 9/29/2022 10/4 9/20   Right Shoulder and wrist PROM and stretch                                Neuro Re-Ed         MTP/LTP        Side-Stepping 3x30 braiding 3x30 braiding 3x30' braiding 3x30' braiding  3x30' braiding   Tandem walk  4x8 on floor 4x8' 4x8' On floor 4x8' 4x8' On floor   Stance on foam 3x30 SLS 3x30" ea 3x30" 3x30" 3x30"   Stance with EC 3x30 3x30" 3x30" 3x30"  3x30"        xgrapevine     3x8'   Supine bic  kicks     2x10   AROM Flex (R) supine 3x10 3x10 3x 10 2x10 2x10   bridges 2x10 2x10 2x10 2x10 2x10   Cane flexion 2x10 2x10 2x10 2x10 2x10   Step overs (roll) lat, front/back        BOSU step overs with balance hold                        Ther Ex        Nustep  L4 10' L4 10'  L4 10 mins   lunges R 3#, L 4# 2x10 R 3#, L 4# 2x10 R 3#   L 4#  20 R 2#, L 3# 2x10 R  2#  L 3#   20 each   TR/HR 20x 2x10 20x 20 2x10 ea   Standing SLR 3-way 2x10 R 3#/L4# 2x10 R3#/L4# R3# L4# 2x10 R 3#, L 4# 2x10 ea R 3# / L 4# 2x10    LAQ 2x10 R 3#/L4# 2x10 R3#/L4# R3# L4# 2x10 3" 2x10 R3#/L4# R 3# / L 4#  2x10   Seated Marches 2x10 R3#/L4# 2x10 R3#/L4# R3# L4# 2x10 R 3#, L 4# 2x10 R 3#  L 4#   2x10   Seated HS curls with TB 2x10 BTB BTB 2x10 BTB  2x20 BTB 2x20  BTB  2x20   Sup/Pron Roller        Bicep Curls 3#  2x10 3# 2x10 3# 2x10 3# 2x10 3#  2x10   Right Side Shrugs with TB BTB 2x10 BTB 2x10 RTB 2x10 BTB 2x10 BTB  2x10   Seated hip abd BTB 2x10 BTB 2x10 GTB 2x10 BTB 2x10 BTB 2x10   squat  R 3#, L 4# 2x10 2x10 R 3#, L 4# 2x10 R 3#, L 4# 2x10   pulleys 5 min  5 min 5 min 5min 5 min    UBE 5 min  5 min 5 min 5 min 5 min   Cat Cow   2x10  2x10 2x10   Bike 10' NuStep above 10 min 10 min 10 min   HS curl Machine 50#  25x 50#  25x 50# 25x 50# 25x 50# 25x   TM    NV   04 mph 10 mins   Ther Activity        Steps                Gait Training                        Modalities

## 2022-10-06 ENCOUNTER — OFFICE VISIT (OUTPATIENT)
Dept: PHYSICAL THERAPY | Facility: CLINIC | Age: 57
End: 2022-10-06
Payer: COMMERCIAL

## 2022-10-06 DIAGNOSIS — Z86.73 HISTORY OF STROKE: ICD-10-CM

## 2022-10-06 DIAGNOSIS — R53.1 RIGHT SIDED WEAKNESS: Primary | ICD-10-CM

## 2022-10-06 PROCEDURE — 97140 MANUAL THERAPY 1/> REGIONS: CPT

## 2022-10-06 PROCEDURE — 97110 THERAPEUTIC EXERCISES: CPT

## 2022-10-06 PROCEDURE — 97112 NEUROMUSCULAR REEDUCATION: CPT

## 2022-10-06 PROCEDURE — 97014 ELECTRIC STIMULATION THERAPY: CPT

## 2022-10-06 NOTE — PROGRESS NOTES
Daily Note     Today's date: 10/6/2022  Patient name: Merle Nguyen  : 1965  MRN: [id number]  Referring provider: Marci Gonsales PT  Dx:   Encounter Diagnosis     ICD-10-CM    1  Right sided weakness  R53 1    2  History of stroke  Z86 73                   Subjective: Patient reports fell last week and landed on right elbow  Patient reports pain in medial scapular border and when moving right UE gets pain in chest muscles  Objective: See treatment diary below      Assessment: Tolerated treatment well  Patient had some tightness in UT  Scapular winging present on the right  Plan: Continue per plan of care        Manuals 2022 9/27/22 2022 10/4 10/6/22   Right Shoulder and wrist PROM and stretch     10 min STM                           Neuro Re-Ed         MTP/LTP        Side-Stepping 3x30 braiding 3x30 braiding 3x30' braiding 3x30' braiding  3x30' braiding   Tandem walk  4x8 on floor 4x8' 4x8' On floor 4x8' 4x8' On floor   Stance on foam 3x30 SLS 3x30" ea 3x30" 3x30"    Stance with EC 3x30 3x30" 3x30" 3x30"     xgrapevine        Supine bic  kicks        AROM Flex (R) supine 3x10 3x10 3x 10 2x10 2x10   bridges 2x10 2x10 2x10 2x10 2x10   Cane flexion 2x10 2x10 2x10 2x10 2x10   Step overs (roll) lat, front/back        BOSU step overs with balance hold                        Ther Ex        Nustep  L4 10' L4 10'     lunges R 3#, L 4# 2x10 R 3#, L 4# 2x10 R 3#   L  4#  20 R 2#, L 3# 2x10 R  2#  L 3#   20 each   TR/HR 20x 2x10 20x 20 2x10 ea   Standing SLR 3-way 2x10 R 3#/L4# 2x10 R3#/L4# R3# L4# 2x10 R 3#, L 4# 2x10 ea R 3# / L 4# 2x10    LAQ 2x10 R 3#/L4# 2x10 R3#/L4# R3# L4# 2x10 3" 2x10 R3#/L4# R 3# / L 4#  2x10   Seated Marches 2x10 R3#/L4# 2x10 R3#/L4# R3# L4# 2x10 R 3#, L 4# 2x10 R 3#  L 4#   2x10   Seated HS curls with TB 2x10 BTB BTB 2x10 BTB  2x20 BTB 2x20     Sup/Pron Roller        Bicep Curls 3#  2x10 3# 2x10 3# 2x10 3# 2x10 3#  2x10   Right Side Shrugs with TB BTB 2x10 BTB 2x10 RTB 2x10 BTB 2x10 BTB  2x10   Seated hip abd BTB 2x10 BTB 2x10 GTB 2x10 BTB 2x10    squat  R 3#, L 4# 2x10 2x10 R 3#, L 4# 2x10 R 3#, L 4# 2x10   pulleys 5 min  5 min 5 min 5min    UBE 5 min  5 min 5 min 5 min 5 min   Cat Cow   2x10  2x10    Bike 10' NuStep above 10 min 10 min 10 min   HS curl Machine 50#  25x 50#  25x 50# 25x 50# 25x 50# 25x   TM    NV     Ther Activity        Steps                Gait Training                        Modalities        MHP with ES to right posterior shoulder and UT     15 min

## 2022-10-11 ENCOUNTER — APPOINTMENT (OUTPATIENT)
Dept: PHYSICAL THERAPY | Facility: CLINIC | Age: 57
End: 2022-10-11

## 2022-10-11 NOTE — PROGRESS NOTES
Daily Note     Today's date: 10/11/2022  Patient name: Joni Catalan  : 1965  MRN: [id number]  Referring provider: Georgia Richards PT  Dx:   Encounter Diagnosis     ICD-10-CM    1  Right sided weakness  R53 1    2  History of stroke  Z86 73    3  Abnormal gait  R26 9                   Subjective:       Objective: See treatment diary below      Assessment: Tolerated treatment {Tolerated treatment :[id number]}  Patient {assessment:[id number]}      Plan: Continue per plan of care        Manuals 10/11 9/27/22 2022 10/4 10/6/22   Right Shoulder and wrist PROM and stretch 10 min STM    10 min STM                           Neuro Re-Ed         MTP/LTP        Side-Stepping 3x30' braiding 3x30 braiding 3x30' braiding 3x30' braiding  3x30' braiding   Tandem walk  4x8' on floor 4x8' 4x8' On floor 4x8' 4x8' On floor   Stance on foam  SLS 3x30" ea 3x30" 3x30"    Stance with EC  3x30" 3x30" 3x30"     xgrapevine        Supine bic  kicks        AROM Flex (R) supine 2x10 3x10 3x 10 2x10 2x10   bridges 2x10 2x10 2x10 2x10 2x10   Cane flexion 2x10 2x10 2x10 2x10 2x10   Step overs (roll) lat, front/back        BOSU step overs with balance hold                        Ther Ex        Nustep  L4 10' L4 10'     lunges R 2#, L 3#   2x10 ea R 3#, L 4# 2x10 R 3#   L  4#  20 R 2#, L 3# 2x10 R  2#  L 3#   20 each   TR/HR 20x ea 2x10 20x 20 2x10 ea   Standing SLR 3-way 2x10 R 3#/L4# 2x10 R3#/L4# R3# L4# 2x10 R 3#, L 4# 2x10 ea R 3# / L 4# 2x10    LAQ 2x10 R 3#/L4# 2x10 R3#/L4# R3# L4# 2x10 3" 2x10 R3#/L4# R 3# / L 4#  2x10   Seated Marches 2x10 R3#/L4# 2x10 R3#/L4# R3# L4# 2x10 R 3#, L 4# 2x10 R 3#  L 4#   2x10   Seated HS curls with TB  BTB 2x10 BTB  2x20 BTB 2x20     Sup/Pron Roller        Bicep Curls 3#  2x10 3# 2x10 3# 2x10 3# 2x10 3#  2x10   Right Side Shrugs with TB BTB 2x10 BTB 2x10 RTB 2x10 BTB 2x10 BTB  2x10   Seated hip abd  BTB 2x10 GTB 2x10 BTB 2x10    squat R 3#, L 4# 2x10 R 3#, L 4# 2x10 2x10 R 3#, L 4# 2x10 R 3#, L 4# 2x10   pulleys  5 min 5 min 5min    UBE 5 min  5 min 5 min 5 min 5 min   Cat Cow   2x10  2x10    Bike 10' min NuStep above 10 min 10 min 10 min   HS curl Machine 50#  25x 50#  25x 50# 25x 50# 25x 50# 25x   TM    NV     Ther Activity        Steps                Gait Training                        Modalities        MHP with ES to right posterior shoulder and UT 15 min    15 min

## 2022-10-13 ENCOUNTER — APPOINTMENT (OUTPATIENT)
Dept: PHYSICAL THERAPY | Facility: CLINIC | Age: 57
End: 2022-10-13

## 2022-10-18 ENCOUNTER — APPOINTMENT (OUTPATIENT)
Dept: PHYSICAL THERAPY | Facility: CLINIC | Age: 57
End: 2022-10-18

## 2022-10-20 ENCOUNTER — APPOINTMENT (OUTPATIENT)
Dept: PHYSICAL THERAPY | Facility: CLINIC | Age: 57
End: 2022-10-20

## 2022-10-25 ENCOUNTER — APPOINTMENT (OUTPATIENT)
Dept: PHYSICAL THERAPY | Facility: CLINIC | Age: 57
End: 2022-10-25

## 2022-10-27 ENCOUNTER — APPOINTMENT (OUTPATIENT)
Dept: PHYSICAL THERAPY | Facility: CLINIC | Age: 57
End: 2022-10-27